# Patient Record
Sex: FEMALE | Race: WHITE | Employment: OTHER | URBAN - METROPOLITAN AREA
[De-identification: names, ages, dates, MRNs, and addresses within clinical notes are randomized per-mention and may not be internally consistent; named-entity substitution may affect disease eponyms.]

---

## 2022-01-01 ENCOUNTER — APPOINTMENT (OUTPATIENT)
Dept: CT IMAGING | Age: 87
DRG: 871 | End: 2022-01-01
Payer: MEDICARE

## 2022-01-01 ENCOUNTER — APPOINTMENT (OUTPATIENT)
Dept: GENERAL RADIOLOGY | Age: 87
DRG: 871 | End: 2022-01-01
Payer: MEDICARE

## 2022-01-01 ENCOUNTER — HOSPITAL ENCOUNTER (INPATIENT)
Age: 87
LOS: 2 days | DRG: 872 | End: 2022-09-18
Attending: INTERNAL MEDICINE | Admitting: INTERNAL MEDICINE
Payer: COMMERCIAL

## 2022-01-01 ENCOUNTER — HOSPITAL ENCOUNTER (INPATIENT)
Age: 87
LOS: 6 days | Discharge: HOSPICE | DRG: 871 | End: 2022-09-16
Attending: EMERGENCY MEDICINE | Admitting: INTERNAL MEDICINE
Payer: MEDICARE

## 2022-01-01 VITALS
RESPIRATION RATE: 16 BRPM | HEART RATE: 85 BPM | DIASTOLIC BLOOD PRESSURE: 52 MMHG | OXYGEN SATURATION: 100 % | BODY MASS INDEX: 20.77 KG/M2 | SYSTOLIC BLOOD PRESSURE: 123 MMHG | WEIGHT: 110 LBS | HEIGHT: 61 IN | TEMPERATURE: 95 F

## 2022-01-01 VITALS
DIASTOLIC BLOOD PRESSURE: 70 MMHG | SYSTOLIC BLOOD PRESSURE: 136 MMHG | HEART RATE: 95 BPM | OXYGEN SATURATION: 81 % | RESPIRATION RATE: 24 BRPM | TEMPERATURE: 98.6 F

## 2022-01-01 DIAGNOSIS — A41.9 SEPSIS, DUE TO UNSPECIFIED ORGANISM, UNSPECIFIED WHETHER ACUTE ORGAN DYSFUNCTION PRESENT (HCC): Primary | ICD-10-CM

## 2022-01-01 LAB
ABSOLUTE EOS #: 0.06 K/UL (ref 0–0.44)
ABSOLUTE EOS #: 0.07 K/UL (ref 0–0.44)
ABSOLUTE EOS #: 0.15 K/UL (ref 0–0.44)
ABSOLUTE EOS #: 0.16 K/UL (ref 0–0.44)
ABSOLUTE EOS #: 0.39 K/UL (ref 0–0.44)
ABSOLUTE EOS #: 0.45 K/UL (ref 0–0.44)
ABSOLUTE IMMATURE GRANULOCYTE: 0.08 K/UL (ref 0–0.3)
ABSOLUTE IMMATURE GRANULOCYTE: 0.08 K/UL (ref 0–0.3)
ABSOLUTE IMMATURE GRANULOCYTE: 0.11 K/UL (ref 0–0.3)
ABSOLUTE IMMATURE GRANULOCYTE: 0.11 K/UL (ref 0–0.3)
ABSOLUTE IMMATURE GRANULOCYTE: 0.16 K/UL (ref 0–0.3)
ABSOLUTE IMMATURE GRANULOCYTE: 0.19 K/UL (ref 0–0.3)
ABSOLUTE LYMPH #: 0.47 K/UL (ref 1.1–3.7)
ABSOLUTE LYMPH #: 0.78 K/UL (ref 1.1–3.7)
ABSOLUTE LYMPH #: 1.17 K/UL (ref 1.1–3.7)
ABSOLUTE LYMPH #: 1.19 K/UL (ref 1.1–3.7)
ABSOLUTE LYMPH #: 1.28 K/UL (ref 1.1–3.7)
ABSOLUTE LYMPH #: 1.31 K/UL (ref 1.1–3.7)
ABSOLUTE MONO #: 0.87 K/UL (ref 0.1–1.2)
ABSOLUTE MONO #: 0.94 K/UL (ref 0.1–1.2)
ABSOLUTE MONO #: 1.05 K/UL (ref 0.1–1.2)
ABSOLUTE MONO #: 1.07 K/UL (ref 0.1–1.2)
ABSOLUTE MONO #: 1.13 K/UL (ref 0.1–1.2)
ABSOLUTE MONO #: 1.16 K/UL (ref 0.1–1.2)
ALBUMIN SERPL-MCNC: 3.4 G/DL (ref 3.5–5.2)
ALBUMIN/GLOBULIN RATIO: 0.9 (ref 1–2.5)
ALP BLD-CCNC: 71 U/L (ref 35–104)
ALT SERPL-CCNC: 15 U/L (ref 5–33)
ANION GAP SERPL CALCULATED.3IONS-SCNC: 10 MMOL/L (ref 9–17)
ANION GAP SERPL CALCULATED.3IONS-SCNC: 12 MMOL/L (ref 9–17)
ANION GAP SERPL CALCULATED.3IONS-SCNC: 7 MMOL/L (ref 9–17)
ANION GAP SERPL CALCULATED.3IONS-SCNC: 7 MMOL/L (ref 9–17)
AST SERPL-CCNC: 31 U/L
BACTERIA: ABNORMAL
BASOPHILS # BLD: 0 % (ref 0–2)
BASOPHILS ABSOLUTE: 0 K/UL (ref 0–0.2)
BASOPHILS ABSOLUTE: 0 K/UL (ref 0–0.2)
BASOPHILS ABSOLUTE: 0.03 K/UL (ref 0–0.2)
BASOPHILS ABSOLUTE: 0.03 K/UL (ref 0–0.2)
BASOPHILS ABSOLUTE: 0.04 K/UL (ref 0–0.2)
BASOPHILS ABSOLUTE: 0.06 K/UL (ref 0–0.2)
BILIRUB SERPL-MCNC: 0.5 MG/DL (ref 0.3–1.2)
BILIRUBIN URINE: NEGATIVE
BUN BLDV-MCNC: 11 MG/DL (ref 8–23)
BUN BLDV-MCNC: 15 MG/DL (ref 8–23)
BUN BLDV-MCNC: 15 MG/DL (ref 8–23)
BUN BLDV-MCNC: 16 MG/DL (ref 8–23)
BUN BLDV-MCNC: 18 MG/DL (ref 8–23)
BUN BLDV-MCNC: 19 MG/DL (ref 8–23)
CALCIUM SERPL-MCNC: 8.4 MG/DL (ref 8.6–10.4)
CALCIUM SERPL-MCNC: 8.9 MG/DL (ref 8.6–10.4)
CALCIUM SERPL-MCNC: 8.9 MG/DL (ref 8.6–10.4)
CALCIUM SERPL-MCNC: 9.1 MG/DL (ref 8.6–10.4)
CALCIUM SERPL-MCNC: 9.1 MG/DL (ref 8.6–10.4)
CALCIUM SERPL-MCNC: 9.4 MG/DL (ref 8.6–10.4)
CASTS UA: ABNORMAL /LPF (ref 0–8)
CHLORIDE BLD-SCNC: 86 MMOL/L (ref 98–107)
CHLORIDE BLD-SCNC: 87 MMOL/L (ref 98–107)
CHLORIDE BLD-SCNC: 91 MMOL/L (ref 98–107)
CHLORIDE BLD-SCNC: 92 MMOL/L (ref 98–107)
CHLORIDE BLD-SCNC: 93 MMOL/L (ref 98–107)
CHLORIDE BLD-SCNC: 94 MMOL/L (ref 98–107)
CO2: 24 MMOL/L (ref 20–31)
CO2: 26 MMOL/L (ref 20–31)
CO2: 30 MMOL/L (ref 20–31)
CO2: 32 MMOL/L (ref 20–31)
CO2: 33 MMOL/L (ref 20–31)
CO2: 34 MMOL/L (ref 20–31)
COLOR: YELLOW
CREAT SERPL-MCNC: 0.48 MG/DL (ref 0.5–0.9)
CREAT SERPL-MCNC: 0.55 MG/DL (ref 0.5–0.9)
CREAT SERPL-MCNC: 0.56 MG/DL (ref 0.5–0.9)
CREAT SERPL-MCNC: 0.59 MG/DL (ref 0.5–0.9)
CREAT SERPL-MCNC: 0.6 MG/DL (ref 0.5–0.9)
CREAT SERPL-MCNC: 0.72 MG/DL (ref 0.5–0.9)
CULTURE: ABNORMAL
CULTURE: NORMAL
CULTURE: NORMAL
D-DIMER QUANTITATIVE: 4.99 MG/L FEU
EKG ATRIAL RATE: 104 BPM
EKG P AXIS: 50 DEGREES
EKG P-R INTERVAL: 138 MS
EKG Q-T INTERVAL: 310 MS
EKG QRS DURATION: 82 MS
EKG QTC CALCULATION (BAZETT): 407 MS
EKG R AXIS: -4 DEGREES
EKG T AXIS: 57 DEGREES
EKG VENTRICULAR RATE: 104 BPM
EOSINOPHILS RELATIVE PERCENT: 0 % (ref 1–4)
EOSINOPHILS RELATIVE PERCENT: 0 % (ref 1–4)
EOSINOPHILS RELATIVE PERCENT: 1 % (ref 1–4)
EOSINOPHILS RELATIVE PERCENT: 1 % (ref 1–4)
EOSINOPHILS RELATIVE PERCENT: 2 % (ref 1–4)
EOSINOPHILS RELATIVE PERCENT: 3 % (ref 1–4)
EPITHELIAL CELLS UA: ABNORMAL /HPF (ref 0–5)
GFR AFRICAN AMERICAN: >60 ML/MIN
GFR NON-AFRICAN AMERICAN: >60 ML/MIN
GFR SERPL CREATININE-BSD FRML MDRD: ABNORMAL ML/MIN/{1.73_M2}
GLUCOSE BLD-MCNC: 102 MG/DL (ref 65–105)
GLUCOSE BLD-MCNC: 102 MG/DL (ref 70–99)
GLUCOSE BLD-MCNC: 104 MG/DL (ref 70–99)
GLUCOSE BLD-MCNC: 129 MG/DL (ref 65–105)
GLUCOSE BLD-MCNC: 132 MG/DL (ref 65–105)
GLUCOSE BLD-MCNC: 153 MG/DL (ref 65–105)
GLUCOSE BLD-MCNC: 183 MG/DL (ref 65–105)
GLUCOSE BLD-MCNC: 221 MG/DL (ref 65–105)
GLUCOSE BLD-MCNC: 82 MG/DL (ref 70–99)
GLUCOSE BLD-MCNC: 89 MG/DL (ref 65–105)
GLUCOSE BLD-MCNC: 92 MG/DL (ref 65–105)
GLUCOSE BLD-MCNC: 92 MG/DL (ref 65–105)
GLUCOSE BLD-MCNC: 92 MG/DL (ref 70–99)
GLUCOSE BLD-MCNC: 93 MG/DL (ref 65–105)
GLUCOSE BLD-MCNC: 93 MG/DL (ref 70–99)
GLUCOSE BLD-MCNC: 94 MG/DL (ref 65–105)
GLUCOSE BLD-MCNC: 96 MG/DL (ref 70–99)
GLUCOSE BLD-MCNC: 97 MG/DL (ref 65–105)
GLUCOSE BLD-MCNC: 99 MG/DL (ref 65–105)
GLUCOSE URINE: NEGATIVE
HCT VFR BLD CALC: 34.4 % (ref 36.3–47.1)
HCT VFR BLD CALC: 34.8 % (ref 36.3–47.1)
HCT VFR BLD CALC: 35.5 % (ref 36.3–47.1)
HCT VFR BLD CALC: 36.3 % (ref 36.3–47.1)
HCT VFR BLD CALC: 36.8 % (ref 36.3–47.1)
HCT VFR BLD CALC: 40.8 % (ref 36.3–47.1)
HEMOGLOBIN: 11.5 G/DL (ref 11.9–15.1)
HEMOGLOBIN: 11.6 G/DL (ref 11.9–15.1)
HEMOGLOBIN: 11.9 G/DL (ref 11.9–15.1)
HEMOGLOBIN: 11.9 G/DL (ref 11.9–15.1)
HEMOGLOBIN: 12.7 G/DL (ref 11.9–15.1)
HEMOGLOBIN: 12.9 G/DL (ref 11.9–15.1)
IMMATURE GRANULOCYTES: 1 %
KETONES, URINE: NEGATIVE
LACTIC ACID, SEPSIS WHOLE BLOOD: 0.7 MMOL/L (ref 0.5–1.9)
LACTIC ACID, SEPSIS WHOLE BLOOD: 1.3 MMOL/L (ref 0.5–1.9)
LACTIC ACID, WHOLE BLOOD: 1 MMOL/L (ref 0.7–2.1)
LEUKOCYTE ESTERASE, URINE: ABNORMAL
LYMPHOCYTES # BLD: 3 % (ref 24–43)
LYMPHOCYTES # BLD: 4 % (ref 24–43)
LYMPHOCYTES # BLD: 7 % (ref 24–43)
LYMPHOCYTES # BLD: 7 % (ref 24–43)
LYMPHOCYTES # BLD: 8 % (ref 24–43)
LYMPHOCYTES # BLD: 9 % (ref 24–43)
Lab: NORMAL
Lab: NORMAL
MCH RBC QN AUTO: 29.7 PG (ref 25.2–33.5)
MCH RBC QN AUTO: 30.6 PG (ref 25.2–33.5)
MCH RBC QN AUTO: 30.7 PG (ref 25.2–33.5)
MCH RBC QN AUTO: 30.7 PG (ref 25.2–33.5)
MCH RBC QN AUTO: 31.2 PG (ref 25.2–33.5)
MCH RBC QN AUTO: 31.3 PG (ref 25.2–33.5)
MCHC RBC AUTO-ENTMCNC: 31.6 G/DL (ref 28.4–34.8)
MCHC RBC AUTO-ENTMCNC: 32.8 G/DL (ref 28.4–34.8)
MCHC RBC AUTO-ENTMCNC: 33.3 G/DL (ref 28.4–34.8)
MCHC RBC AUTO-ENTMCNC: 33.4 G/DL (ref 28.4–34.8)
MCHC RBC AUTO-ENTMCNC: 33.5 G/DL (ref 28.4–34.8)
MCHC RBC AUTO-ENTMCNC: 34.5 G/DL (ref 28.4–34.8)
MCV RBC AUTO: 90.6 FL (ref 82.6–102.9)
MCV RBC AUTO: 91.5 FL (ref 82.6–102.9)
MCV RBC AUTO: 92.1 FL (ref 82.6–102.9)
MCV RBC AUTO: 92.9 FL (ref 82.6–102.9)
MCV RBC AUTO: 93.6 FL (ref 82.6–102.9)
MCV RBC AUTO: 93.8 FL (ref 82.6–102.9)
MONOCYTES # BLD: 6 % (ref 3–12)
MONOCYTES # BLD: 7 % (ref 3–12)
MORPHOLOGY: NORMAL
MORPHOLOGY: NORMAL
NITRITE, URINE: POSITIVE
NRBC AUTOMATED: 0 PER 100 WBC
PDW BLD-RTO: 11.5 % (ref 11.8–14.4)
PDW BLD-RTO: 11.8 % (ref 11.8–14.4)
PDW BLD-RTO: 11.9 % (ref 11.8–14.4)
PH UA: 6.5 (ref 5–8)
PLATELET # BLD: 402 K/UL (ref 138–453)
PLATELET # BLD: 415 K/UL (ref 138–453)
PLATELET # BLD: 424 K/UL (ref 138–453)
PLATELET # BLD: 439 K/UL (ref 138–453)
PLATELET # BLD: 455 K/UL (ref 138–453)
PLATELET # BLD: ABNORMAL K/UL (ref 138–453)
PLATELET, FLUORESCENCE: 491 K/UL (ref 138–453)
PLATELET, IMMATURE FRACTION: 1.5 % (ref 1.1–10.3)
PMV BLD AUTO: 8.3 FL (ref 8.1–13.5)
PMV BLD AUTO: 8.5 FL (ref 8.1–13.5)
PMV BLD AUTO: 8.6 FL (ref 8.1–13.5)
PMV BLD AUTO: 8.7 FL (ref 8.1–13.5)
PMV BLD AUTO: 8.8 FL (ref 8.1–13.5)
POTASSIUM SERPL-SCNC: 4.3 MMOL/L (ref 3.7–5.3)
POTASSIUM SERPL-SCNC: 4.4 MMOL/L (ref 3.7–5.3)
POTASSIUM SERPL-SCNC: 4.6 MMOL/L (ref 3.7–5.3)
POTASSIUM SERPL-SCNC: 4.7 MMOL/L (ref 3.7–5.3)
POTASSIUM SERPL-SCNC: 4.9 MMOL/L (ref 3.7–5.3)
POTASSIUM SERPL-SCNC: 5.4 MMOL/L (ref 3.7–5.3)
PROTEIN UA: ABNORMAL
RBC # BLD: 3.76 M/UL (ref 3.95–5.11)
RBC # BLD: 3.78 M/UL (ref 3.95–5.11)
RBC # BLD: 3.82 M/UL (ref 3.95–5.11)
RBC # BLD: 3.88 M/UL (ref 3.95–5.11)
RBC # BLD: 4.06 M/UL (ref 3.95–5.11)
RBC # BLD: 4.35 M/UL (ref 3.95–5.11)
RBC UA: ABNORMAL /HPF (ref 0–4)
SARS-COV-2, RAPID: NOT DETECTED
SEG NEUTROPHILS: 81 % (ref 36–65)
SEG NEUTROPHILS: 85 % (ref 36–65)
SEG NEUTROPHILS: 87 % (ref 36–65)
SEG NEUTROPHILS: 89 % (ref 36–65)
SEGMENTED NEUTROPHILS ABSOLUTE COUNT: 10.89 K/UL (ref 1.5–8.1)
SEGMENTED NEUTROPHILS ABSOLUTE COUNT: 13.91 K/UL (ref 1.5–8.1)
SEGMENTED NEUTROPHILS ABSOLUTE COUNT: 13.97 K/UL (ref 1.5–8.1)
SEGMENTED NEUTROPHILS ABSOLUTE COUNT: 14.46 K/UL (ref 1.5–8.1)
SEGMENTED NEUTROPHILS ABSOLUTE COUNT: 15.88 K/UL (ref 1.5–8.1)
SEGMENTED NEUTROPHILS ABSOLUTE COUNT: 16.88 K/UL (ref 1.5–8.1)
SODIUM BLD-SCNC: 127 MMOL/L (ref 135–144)
SODIUM BLD-SCNC: 129 MMOL/L (ref 135–144)
SODIUM BLD-SCNC: 131 MMOL/L (ref 135–144)
SODIUM BLD-SCNC: 131 MMOL/L (ref 135–144)
SODIUM BLD-SCNC: 132 MMOL/L (ref 135–144)
SODIUM BLD-SCNC: 132 MMOL/L (ref 135–144)
SPECIFIC GRAVITY UA: 1.02 (ref 1–1.03)
SPECIMEN DESCRIPTION: ABNORMAL
SPECIMEN DESCRIPTION: NORMAL
TOTAL PROTEIN: 7 G/DL (ref 6.4–8.3)
TURBIDITY: ABNORMAL
URINE HGB: ABNORMAL
UROBILINOGEN, URINE: NORMAL
WBC # BLD: 13.5 K/UL (ref 3.5–11.3)
WBC # BLD: 15.7 K/UL (ref 3.5–11.3)
WBC # BLD: 16.4 K/UL (ref 3.5–11.3)
WBC # BLD: 17 K/UL (ref 3.5–11.3)
WBC # BLD: 18.6 K/UL (ref 3.5–11.3)
WBC # BLD: 19.4 K/UL (ref 3.5–11.3)
WBC UA: ABNORMAL /HPF (ref 0–5)

## 2022-01-01 PROCEDURE — 2580000003 HC RX 258

## 2022-01-01 PROCEDURE — 6360000002 HC RX W HCPCS: Performed by: STUDENT IN AN ORGANIZED HEALTH CARE EDUCATION/TRAINING PROGRAM

## 2022-01-01 PROCEDURE — 82947 ASSAY GLUCOSE BLOOD QUANT: CPT

## 2022-01-01 PROCEDURE — 85025 COMPLETE CBC W/AUTO DIFF WBC: CPT

## 2022-01-01 PROCEDURE — 6370000000 HC RX 637 (ALT 250 FOR IP): Performed by: STUDENT IN AN ORGANIZED HEALTH CARE EDUCATION/TRAINING PROGRAM

## 2022-01-01 PROCEDURE — 94761 N-INVAS EAR/PLS OXIMETRY MLT: CPT

## 2022-01-01 PROCEDURE — 6370000000 HC RX 637 (ALT 250 FOR IP)

## 2022-01-01 PROCEDURE — 87040 BLOOD CULTURE FOR BACTERIA: CPT

## 2022-01-01 PROCEDURE — 80048 BASIC METABOLIC PNL TOTAL CA: CPT

## 2022-01-01 PROCEDURE — 6360000002 HC RX W HCPCS

## 2022-01-01 PROCEDURE — 97535 SELF CARE MNGMENT TRAINING: CPT

## 2022-01-01 PROCEDURE — 94640 AIRWAY INHALATION TREATMENT: CPT

## 2022-01-01 PROCEDURE — 83605 ASSAY OF LACTIC ACID: CPT

## 2022-01-01 PROCEDURE — 2060000000 HC ICU INTERMEDIATE R&B

## 2022-01-01 PROCEDURE — 99223 1ST HOSP IP/OBS HIGH 75: CPT | Performed by: INTERNAL MEDICINE

## 2022-01-01 PROCEDURE — 99232 SBSQ HOSP IP/OBS MODERATE 35: CPT | Performed by: INTERNAL MEDICINE

## 2022-01-01 PROCEDURE — 99285 EMERGENCY DEPT VISIT HI MDM: CPT

## 2022-01-01 PROCEDURE — 2700000000 HC OXYGEN THERAPY PER DAY

## 2022-01-01 PROCEDURE — 85055 RETICULATED PLATELET ASSAY: CPT

## 2022-01-01 PROCEDURE — 2580000003 HC RX 258: Performed by: STUDENT IN AN ORGANIZED HEALTH CARE EDUCATION/TRAINING PROGRAM

## 2022-01-01 PROCEDURE — 87635 SARS-COV-2 COVID-19 AMP PRB: CPT

## 2022-01-01 PROCEDURE — 2500000003 HC RX 250 WO HCPCS

## 2022-01-01 PROCEDURE — 80053 COMPREHEN METABOLIC PANEL: CPT

## 2022-01-01 PROCEDURE — 94660 CPAP INITIATION&MGMT: CPT

## 2022-01-01 PROCEDURE — 71045 X-RAY EXAM CHEST 1 VIEW: CPT

## 2022-01-01 PROCEDURE — 97530 THERAPEUTIC ACTIVITIES: CPT

## 2022-01-01 PROCEDURE — 93010 ELECTROCARDIOGRAM REPORT: CPT | Performed by: INTERNAL MEDICINE

## 2022-01-01 PROCEDURE — 85379 FIBRIN DEGRADATION QUANT: CPT

## 2022-01-01 PROCEDURE — 1250000000 HC SEMI PRIVATE HOSPICE R&B

## 2022-01-01 PROCEDURE — 36415 COLL VENOUS BLD VENIPUNCTURE: CPT

## 2022-01-01 PROCEDURE — 2500000003 HC RX 250 WO HCPCS: Performed by: STUDENT IN AN ORGANIZED HEALTH CARE EDUCATION/TRAINING PROGRAM

## 2022-01-01 PROCEDURE — 99291 CRITICAL CARE FIRST HOUR: CPT | Performed by: INTERNAL MEDICINE

## 2022-01-01 PROCEDURE — 6360000004 HC RX CONTRAST MEDICATION: Performed by: STUDENT IN AN ORGANIZED HEALTH CARE EDUCATION/TRAINING PROGRAM

## 2022-01-01 PROCEDURE — 81001 URINALYSIS AUTO W/SCOPE: CPT

## 2022-01-01 PROCEDURE — 87086 URINE CULTURE/COLONY COUNT: CPT

## 2022-01-01 PROCEDURE — 87186 SC STD MICRODIL/AGAR DIL: CPT

## 2022-01-01 PROCEDURE — 97162 PT EVAL MOD COMPLEX 30 MIN: CPT

## 2022-01-01 PROCEDURE — 93005 ELECTROCARDIOGRAM TRACING: CPT | Performed by: STUDENT IN AN ORGANIZED HEALTH CARE EDUCATION/TRAINING PROGRAM

## 2022-01-01 PROCEDURE — 97166 OT EVAL MOD COMPLEX 45 MIN: CPT

## 2022-01-01 PROCEDURE — 71260 CT THORAX DX C+: CPT | Performed by: STUDENT IN AN ORGANIZED HEALTH CARE EDUCATION/TRAINING PROGRAM

## 2022-01-01 PROCEDURE — 87088 URINE BACTERIA CULTURE: CPT

## 2022-01-01 RX ORDER — BENZONATATE 100 MG/1
100 CAPSULE ORAL 3 TIMES DAILY PRN
Status: DISCONTINUED | OUTPATIENT
Start: 2022-01-01 | End: 2022-01-01 | Stop reason: HOSPADM

## 2022-01-01 RX ORDER — SODIUM PHOSPHATE, DIBASIC AND SODIUM PHOSPHATE, MONOBASIC 7; 19 G/133ML; G/133ML
1 ENEMA RECTAL
Status: COMPLETED | OUTPATIENT
Start: 2022-01-01 | End: 2022-01-01

## 2022-01-01 RX ORDER — MORPHINE SULFATE 10 MG/5ML
5 SOLUTION ORAL
Status: CANCELLED | OUTPATIENT
Start: 2022-01-01

## 2022-01-01 RX ORDER — ONDANSETRON 2 MG/ML
4 INJECTION INTRAMUSCULAR; INTRAVENOUS EVERY 6 HOURS PRN
Status: DISCONTINUED | OUTPATIENT
Start: 2022-01-01 | End: 2022-01-01 | Stop reason: HOSPADM

## 2022-01-01 RX ORDER — GLYCOPYRROLATE 0.2 MG/ML
0.2 INJECTION INTRAMUSCULAR; INTRAVENOUS EVERY 4 HOURS PRN
Status: DISCONTINUED | OUTPATIENT
Start: 2022-01-01 | End: 2022-01-01 | Stop reason: HOSPADM

## 2022-01-01 RX ORDER — ZOLPIDEM TARTRATE 5 MG/1
5 TABLET ORAL NIGHTLY PRN
Status: CANCELLED | OUTPATIENT
Start: 2022-01-01

## 2022-01-01 RX ORDER — SODIUM CHLORIDE 9 MG/ML
INJECTION, SOLUTION INTRAVENOUS PRN
Status: DISCONTINUED | OUTPATIENT
Start: 2022-01-01 | End: 2022-01-01

## 2022-01-01 RX ORDER — SODIUM CHLORIDE 0.9 % (FLUSH) 0.9 %
5-40 SYRINGE (ML) INJECTION EVERY 12 HOURS SCHEDULED
Status: DISCONTINUED | OUTPATIENT
Start: 2022-01-01 | End: 2022-01-01

## 2022-01-01 RX ORDER — ALBUTEROL SULFATE 90 UG/1
2 AEROSOL, METERED RESPIRATORY (INHALATION) EVERY 6 HOURS PRN
COMMUNITY

## 2022-01-01 RX ORDER — LORAZEPAM 1 MG/1
1 TABLET ORAL
Status: DISCONTINUED | OUTPATIENT
Start: 2022-01-01 | End: 2022-01-01 | Stop reason: HOSPADM

## 2022-01-01 RX ORDER — LORAZEPAM 2 MG/ML
0.5 INJECTION INTRAMUSCULAR EVERY 4 HOURS PRN
Status: DISCONTINUED | OUTPATIENT
Start: 2022-01-01 | End: 2022-01-01 | Stop reason: HOSPADM

## 2022-01-01 RX ORDER — ACETAMINOPHEN 325 MG/1
650 TABLET ORAL EVERY 4 HOURS PRN
Status: DISCONTINUED | OUTPATIENT
Start: 2022-01-01 | End: 2022-01-01 | Stop reason: HOSPADM

## 2022-01-01 RX ORDER — SODIUM CHLORIDE 0.9 % (FLUSH) 0.9 %
5-40 SYRINGE (ML) INJECTION PRN
Status: CANCELLED | OUTPATIENT
Start: 2022-01-01

## 2022-01-01 RX ORDER — GUAIFENESIN DEXTROMETHORPHAN HYDROBROMIDE ORAL SOLUTION 10; 100 MG/5ML; MG/5ML
10 SOLUTION ORAL EVERY 6 HOURS PRN
Status: DISCONTINUED | OUTPATIENT
Start: 2022-01-01 | End: 2022-01-01 | Stop reason: HOSPADM

## 2022-01-01 RX ORDER — LORAZEPAM 1 MG/1
1 TABLET ORAL
Status: CANCELLED | OUTPATIENT
Start: 2022-01-01

## 2022-01-01 RX ORDER — GUAIFENESIN DEXTROMETHORPHAN HYDROBROMIDE ORAL SOLUTION 10; 100 MG/5ML; MG/5ML
10 SOLUTION ORAL EVERY 6 HOURS PRN
Status: DISCONTINUED | OUTPATIENT
Start: 2022-01-01 | End: 2022-01-01

## 2022-01-01 RX ORDER — ACETAMINOPHEN 325 MG/1
650 TABLET ORAL EVERY 4 HOURS PRN
Status: CANCELLED | OUTPATIENT
Start: 2022-01-01

## 2022-01-01 RX ORDER — ONDANSETRON 4 MG/1
4 TABLET, ORALLY DISINTEGRATING ORAL EVERY 8 HOURS PRN
Status: DISCONTINUED | OUTPATIENT
Start: 2022-01-01 | End: 2022-01-01 | Stop reason: HOSPADM

## 2022-01-01 RX ORDER — ALPRAZOLAM 0.25 MG/1
0.25 TABLET ORAL DAILY PRN
Status: DISCONTINUED | OUTPATIENT
Start: 2022-01-01 | End: 2022-01-01

## 2022-01-01 RX ORDER — ALPRAZOLAM 0.25 MG/1
0.25 TABLET ORAL NIGHTLY PRN
COMMUNITY

## 2022-01-01 RX ORDER — ZOLPIDEM TARTRATE 5 MG/1
5 TABLET ORAL NIGHTLY PRN
Status: DISCONTINUED | OUTPATIENT
Start: 2022-01-01 | End: 2022-01-01

## 2022-01-01 RX ORDER — DEXTROSE MONOHYDRATE 100 MG/ML
INJECTION, SOLUTION INTRAVENOUS CONTINUOUS PRN
Status: DISCONTINUED | OUTPATIENT
Start: 2022-01-01 | End: 2022-01-01 | Stop reason: HOSPADM

## 2022-01-01 RX ORDER — ALPRAZOLAM 0.5 MG/1
0.5 TABLET ORAL 4 TIMES DAILY PRN
Status: DISCONTINUED | OUTPATIENT
Start: 2022-01-01 | End: 2022-01-01 | Stop reason: HOSPADM

## 2022-01-01 RX ORDER — VANCOMYCIN HYDROCHLORIDE 1 G/200ML
1000 INJECTION, SOLUTION INTRAVENOUS ONCE
Status: COMPLETED | OUTPATIENT
Start: 2022-01-01 | End: 2022-01-01

## 2022-01-01 RX ORDER — MORPHINE SULFATE 10 MG/5ML
5 SOLUTION ORAL
Status: DISCONTINUED | OUTPATIENT
Start: 2022-01-01 | End: 2022-01-01 | Stop reason: HOSPADM

## 2022-01-01 RX ORDER — BENZONATATE 100 MG/1
100 CAPSULE ORAL 3 TIMES DAILY PRN
Status: DISCONTINUED | OUTPATIENT
Start: 2022-01-01 | End: 2022-01-01

## 2022-01-01 RX ORDER — GLYCOPYRROLATE 0.2 MG/ML
0.2 INJECTION INTRAMUSCULAR; INTRAVENOUS EVERY 4 HOURS PRN
Status: CANCELLED | OUTPATIENT
Start: 2022-01-01

## 2022-01-01 RX ORDER — ALBUTEROL SULFATE 90 UG/1
2 AEROSOL, METERED RESPIRATORY (INHALATION) EVERY 6 HOURS PRN
Status: DISCONTINUED | OUTPATIENT
Start: 2022-01-01 | End: 2022-01-01 | Stop reason: HOSPADM

## 2022-01-01 RX ORDER — LOPERAMIDE HYDROCHLORIDE 2 MG/1
2 CAPSULE ORAL PRN
Status: DISCONTINUED | OUTPATIENT
Start: 2022-01-01 | End: 2022-01-01

## 2022-01-01 RX ORDER — LOPERAMIDE HYDROCHLORIDE 2 MG/1
2 CAPSULE ORAL PRN
Status: DISCONTINUED | OUTPATIENT
Start: 2022-01-01 | End: 2022-01-01 | Stop reason: HOSPADM

## 2022-01-01 RX ORDER — ALPRAZOLAM 0.5 MG/1
0.5 TABLET ORAL EVERY 4 HOURS PRN
Status: DISCONTINUED | OUTPATIENT
Start: 2022-01-01 | End: 2022-01-01 | Stop reason: HOSPADM

## 2022-01-01 RX ORDER — LOPERAMIDE HYDROCHLORIDE 2 MG/1
2 CAPSULE ORAL PRN
Status: CANCELLED | OUTPATIENT
Start: 2022-01-01

## 2022-01-01 RX ORDER — ALBUTEROL SULFATE 90 UG/1
2 AEROSOL, METERED RESPIRATORY (INHALATION) EVERY 6 HOURS PRN
Status: DISCONTINUED | OUTPATIENT
Start: 2022-01-01 | End: 2022-01-01

## 2022-01-01 RX ORDER — ALPRAZOLAM 0.5 MG/1
0.5 TABLET ORAL 4 TIMES DAILY PRN
Status: DISCONTINUED | OUTPATIENT
Start: 2022-01-01 | End: 2022-01-01

## 2022-01-01 RX ORDER — MORPHINE SULFATE 4 MG/ML
4 INJECTION, SOLUTION INTRAMUSCULAR; INTRAVENOUS
Status: DISCONTINUED | OUTPATIENT
Start: 2022-01-01 | End: 2022-01-01 | Stop reason: HOSPADM

## 2022-01-01 RX ORDER — SODIUM CHLORIDE 0.9 % (FLUSH) 0.9 %
5-40 SYRINGE (ML) INJECTION PRN
Status: DISCONTINUED | OUTPATIENT
Start: 2022-01-01 | End: 2022-01-01 | Stop reason: HOSPADM

## 2022-01-01 RX ORDER — MORPHINE SULFATE 2 MG/ML
2 INJECTION, SOLUTION INTRAMUSCULAR; INTRAVENOUS
Status: DISCONTINUED | OUTPATIENT
Start: 2022-01-01 | End: 2022-01-01 | Stop reason: HOSPADM

## 2022-01-01 RX ORDER — POLYETHYLENE GLYCOL 3350 17 G/17G
17 POWDER, FOR SOLUTION ORAL DAILY PRN
Status: DISCONTINUED | OUTPATIENT
Start: 2022-01-01 | End: 2022-01-01

## 2022-01-01 RX ORDER — ONDANSETRON 4 MG/1
4 TABLET, ORALLY DISINTEGRATING ORAL EVERY 8 HOURS PRN
Status: CANCELLED | OUTPATIENT
Start: 2022-01-01

## 2022-01-01 RX ORDER — BENZONATATE 100 MG/1
100 CAPSULE ORAL 3 TIMES DAILY PRN
Status: CANCELLED | OUTPATIENT
Start: 2022-01-01

## 2022-01-01 RX ORDER — DEXTROSE MONOHYDRATE 100 MG/ML
INJECTION, SOLUTION INTRAVENOUS CONTINUOUS PRN
Status: DISCONTINUED | OUTPATIENT
Start: 2022-01-01 | End: 2022-01-01

## 2022-01-01 RX ORDER — KETOROLAC TROMETHAMINE 30 MG/ML
15 INJECTION, SOLUTION INTRAMUSCULAR; INTRAVENOUS ONCE
Status: COMPLETED | OUTPATIENT
Start: 2022-01-01 | End: 2022-01-01

## 2022-01-01 RX ORDER — SODIUM CHLORIDE 9 MG/ML
INJECTION, SOLUTION INTRAVENOUS PRN
Status: DISCONTINUED | OUTPATIENT
Start: 2022-01-01 | End: 2022-01-01 | Stop reason: HOSPADM

## 2022-01-01 RX ORDER — ALBUTEROL SULFATE 90 UG/1
2 AEROSOL, METERED RESPIRATORY (INHALATION) EVERY 6 HOURS PRN
Status: CANCELLED | OUTPATIENT
Start: 2022-01-01

## 2022-01-01 RX ORDER — ZOLPIDEM TARTRATE 5 MG/1
5 TABLET ORAL NIGHTLY PRN
COMMUNITY

## 2022-01-01 RX ORDER — ALPRAZOLAM 0.25 MG/1
0.25 TABLET ORAL 2 TIMES DAILY PRN
Status: DISCONTINUED | OUTPATIENT
Start: 2022-01-01 | End: 2022-01-01

## 2022-01-01 RX ORDER — ENOXAPARIN SODIUM 100 MG/ML
30 INJECTION SUBCUTANEOUS DAILY
Status: DISCONTINUED | OUTPATIENT
Start: 2022-01-01 | End: 2022-01-01

## 2022-01-01 RX ORDER — POLYETHYLENE GLYCOL 3350 17 G/17G
17 POWDER, FOR SOLUTION ORAL DAILY PRN
Status: DISCONTINUED | OUTPATIENT
Start: 2022-01-01 | End: 2022-01-01 | Stop reason: HOSPADM

## 2022-01-01 RX ORDER — DEXTROSE MONOHYDRATE 100 MG/ML
INJECTION, SOLUTION INTRAVENOUS CONTINUOUS PRN
Status: CANCELLED | OUTPATIENT
Start: 2022-01-01

## 2022-01-01 RX ORDER — MORPHINE SULFATE 2 MG/ML
2 INJECTION, SOLUTION INTRAMUSCULAR; INTRAVENOUS
Status: CANCELLED | OUTPATIENT
Start: 2022-01-01

## 2022-01-01 RX ORDER — ONDANSETRON 2 MG/ML
4 INJECTION INTRAMUSCULAR; INTRAVENOUS EVERY 6 HOURS PRN
Status: CANCELLED | OUTPATIENT
Start: 2022-01-01

## 2022-01-01 RX ORDER — ALPRAZOLAM 0.25 MG/1
0.25 TABLET ORAL NIGHTLY PRN
Status: DISCONTINUED | OUTPATIENT
Start: 2022-01-01 | End: 2022-01-01

## 2022-01-01 RX ORDER — SODIUM CHLORIDE 9 MG/ML
INJECTION, SOLUTION INTRAVENOUS PRN
Status: CANCELLED | OUTPATIENT
Start: 2022-01-01

## 2022-01-01 RX ORDER — ACETAMINOPHEN 650 MG/1
650 SUPPOSITORY RECTAL EVERY 6 HOURS PRN
Status: DISCONTINUED | OUTPATIENT
Start: 2022-01-01 | End: 2022-01-01 | Stop reason: SDUPTHER

## 2022-01-01 RX ORDER — ACETAMINOPHEN 325 MG/1
650 TABLET ORAL EVERY 6 HOURS PRN
Status: DISCONTINUED | OUTPATIENT
Start: 2022-01-01 | End: 2022-01-01 | Stop reason: SDUPTHER

## 2022-01-01 RX ORDER — LORAZEPAM 2 MG/ML
1 CONCENTRATE ORAL
Status: DISCONTINUED | OUTPATIENT
Start: 2022-01-01 | End: 2022-01-01

## 2022-01-01 RX ORDER — PREDNISONE 20 MG/1
40 TABLET ORAL DAILY
Status: DISCONTINUED | OUTPATIENT
Start: 2022-01-01 | End: 2022-01-01

## 2022-01-01 RX ORDER — LORAZEPAM 1 MG/1
1 TABLET ORAL
Status: DISCONTINUED | OUTPATIENT
Start: 2022-01-01 | End: 2022-01-01

## 2022-01-01 RX ORDER — METHYLPREDNISOLONE SODIUM SUCCINATE 40 MG/ML
40 INJECTION, POWDER, LYOPHILIZED, FOR SOLUTION INTRAMUSCULAR; INTRAVENOUS DAILY
Status: DISCONTINUED | OUTPATIENT
Start: 2022-01-01 | End: 2022-01-01

## 2022-01-01 RX ORDER — ALPRAZOLAM 0.5 MG/1
0.5 TABLET ORAL 4 TIMES DAILY PRN
Status: CANCELLED | OUTPATIENT
Start: 2022-01-01

## 2022-01-01 RX ORDER — MORPHINE SULFATE 4 MG/ML
4 INJECTION, SOLUTION INTRAMUSCULAR; INTRAVENOUS
Status: CANCELLED | OUTPATIENT
Start: 2022-01-01

## 2022-01-01 RX ORDER — POLYETHYLENE GLYCOL 3350 17 G/17G
17 POWDER, FOR SOLUTION ORAL DAILY PRN
Status: CANCELLED | OUTPATIENT
Start: 2022-01-01

## 2022-01-01 RX ORDER — ZOLPIDEM TARTRATE 5 MG/1
5 TABLET ORAL NIGHTLY PRN
Status: DISCONTINUED | OUTPATIENT
Start: 2022-01-01 | End: 2022-01-01 | Stop reason: HOSPADM

## 2022-01-01 RX ORDER — 0.9 % SODIUM CHLORIDE 0.9 %
30 INTRAVENOUS SOLUTION INTRAVENOUS ONCE
Status: COMPLETED | OUTPATIENT
Start: 2022-01-01 | End: 2022-01-01

## 2022-01-01 RX ORDER — GUAIFENESIN DEXTROMETHORPHAN HYDROBROMIDE ORAL SOLUTION 10; 100 MG/5ML; MG/5ML
10 SOLUTION ORAL EVERY 6 HOURS PRN
Status: CANCELLED | OUTPATIENT
Start: 2022-01-01

## 2022-01-01 RX ORDER — LORAZEPAM 2 MG/ML
0.5 INJECTION INTRAMUSCULAR EVERY 4 HOURS
Status: DISCONTINUED | OUTPATIENT
Start: 2022-01-01 | End: 2022-01-01

## 2022-01-01 RX ORDER — PREDNISONE 20 MG/1
40 TABLET ORAL DAILY
Status: DISCONTINUED | OUTPATIENT
Start: 2022-01-01 | End: 2022-01-01 | Stop reason: HOSPADM

## 2022-01-01 RX ORDER — IPRATROPIUM BROMIDE AND ALBUTEROL SULFATE 2.5; .5 MG/3ML; MG/3ML
1 SOLUTION RESPIRATORY (INHALATION)
Status: DISCONTINUED | OUTPATIENT
Start: 2022-01-01 | End: 2022-01-01

## 2022-01-01 RX ADMIN — Medication 500 MG: at 20:10

## 2022-01-01 RX ADMIN — GLYCOPYRROLATE 0.2 MG: 0.2 INJECTION INTRAMUSCULAR; INTRAVENOUS at 06:18

## 2022-01-01 RX ADMIN — SODIUM CHLORIDE, PRESERVATIVE FREE 10 ML: 5 INJECTION INTRAVENOUS at 22:20

## 2022-01-01 RX ADMIN — MORPHINE SULFATE 4 MG: 4 INJECTION, SOLUTION INTRAMUSCULAR; INTRAVENOUS at 00:11

## 2022-01-01 RX ADMIN — Medication 500 MG: at 21:50

## 2022-01-01 RX ADMIN — MORPHINE SULFATE 4 MG: 4 INJECTION, SOLUTION INTRAMUSCULAR; INTRAVENOUS at 20:26

## 2022-01-01 RX ADMIN — MORPHINE SULFATE 2 MG: 2 INJECTION, SOLUTION INTRAMUSCULAR; INTRAVENOUS at 20:35

## 2022-01-01 RX ADMIN — IPRATROPIUM BROMIDE AND ALBUTEROL SULFATE 1 AMPULE: .5; 2.5 SOLUTION RESPIRATORY (INHALATION) at 19:59

## 2022-01-01 RX ADMIN — ENOXAPARIN SODIUM 30 MG: 100 INJECTION SUBCUTANEOUS at 10:03

## 2022-01-01 RX ADMIN — PIPERACILLIN AND TAZOBACTAM 3375 MG: 3; .375 INJECTION, POWDER, FOR SOLUTION INTRAVENOUS at 13:45

## 2022-01-01 RX ADMIN — SODIUM CHLORIDE, PRESERVATIVE FREE 10 ML: 5 INJECTION INTRAVENOUS at 06:14

## 2022-01-01 RX ADMIN — IPRATROPIUM BROMIDE AND ALBUTEROL SULFATE 1 AMPULE: .5; 2.5 SOLUTION RESPIRATORY (INHALATION) at 11:17

## 2022-01-01 RX ADMIN — ACETAMINOPHEN 650 MG: 325 TABLET ORAL at 03:46

## 2022-01-01 RX ADMIN — ACETAMINOPHEN 650 MG: 325 TABLET ORAL at 08:49

## 2022-01-01 RX ADMIN — ACETAMINOPHEN 650 MG: 325 TABLET ORAL at 16:46

## 2022-01-01 RX ADMIN — SODIUM CHLORIDE 1497 ML: 9 INJECTION, SOLUTION INTRAVENOUS at 13:45

## 2022-01-01 RX ADMIN — ACETAMINOPHEN 650 MG: 325 TABLET ORAL at 05:08

## 2022-01-01 RX ADMIN — MORPHINE SULFATE 2 MG: 2 INJECTION, SOLUTION INTRAMUSCULAR; INTRAVENOUS at 06:37

## 2022-01-01 RX ADMIN — SODIUM CHLORIDE, PRESERVATIVE FREE 10 ML: 5 INJECTION INTRAVENOUS at 20:05

## 2022-01-01 RX ADMIN — WATER 2000 MG: 1 INJECTION INTRAMUSCULAR; INTRAVENOUS; SUBCUTANEOUS at 20:39

## 2022-01-01 RX ADMIN — ENOXAPARIN SODIUM 30 MG: 100 INJECTION SUBCUTANEOUS at 09:03

## 2022-01-01 RX ADMIN — ALPRAZOLAM 0.5 MG: 0.5 TABLET ORAL at 20:09

## 2022-01-01 RX ADMIN — ALPRAZOLAM 0.25 MG: 0.25 TABLET ORAL at 21:44

## 2022-01-01 RX ADMIN — MORPHINE SULFATE 4 MG: 4 INJECTION, SOLUTION INTRAMUSCULAR; INTRAVENOUS at 18:31

## 2022-01-01 RX ADMIN — Medication 0.5 MG: at 17:59

## 2022-01-01 RX ADMIN — MORPHINE SULFATE 2 MG: 2 INJECTION, SOLUTION INTRAMUSCULAR; INTRAVENOUS at 22:35

## 2022-01-01 RX ADMIN — MORPHINE SULFATE 2 MG: 2 INJECTION, SOLUTION INTRAMUSCULAR; INTRAVENOUS at 12:09

## 2022-01-01 RX ADMIN — MORPHINE SULFATE 4 MG: 4 INJECTION, SOLUTION INTRAMUSCULAR; INTRAVENOUS at 15:47

## 2022-01-01 RX ADMIN — IPRATROPIUM BROMIDE AND ALBUTEROL SULFATE 1 AMPULE: .5; 2.5 SOLUTION RESPIRATORY (INHALATION) at 17:36

## 2022-01-01 RX ADMIN — MORPHINE SULFATE 4 MG: 4 INJECTION, SOLUTION INTRAMUSCULAR; INTRAVENOUS at 06:38

## 2022-01-01 RX ADMIN — IPRATROPIUM BROMIDE AND ALBUTEROL SULFATE 1 AMPULE: .5; 2.5 SOLUTION RESPIRATORY (INHALATION) at 09:12

## 2022-01-01 RX ADMIN — ALPRAZOLAM 0.25 MG: 0.25 TABLET ORAL at 21:46

## 2022-01-01 RX ADMIN — WATER 2000 MG: 1 INJECTION INTRAMUSCULAR; INTRAVENOUS; SUBCUTANEOUS at 21:48

## 2022-01-01 RX ADMIN — PREDNISONE 40 MG: 20 TABLET ORAL at 09:59

## 2022-01-01 RX ADMIN — MORPHINE SULFATE 2 MG: 2 INJECTION, SOLUTION INTRAMUSCULAR; INTRAVENOUS at 16:00

## 2022-01-01 RX ADMIN — IPRATROPIUM BROMIDE AND ALBUTEROL SULFATE 1 AMPULE: .5; 2.5 SOLUTION RESPIRATORY (INHALATION) at 16:28

## 2022-01-01 RX ADMIN — WATER 2000 MG: 1 INJECTION INTRAMUSCULAR; INTRAVENOUS; SUBCUTANEOUS at 21:45

## 2022-01-01 RX ADMIN — PREDNISONE 40 MG: 20 TABLET ORAL at 08:07

## 2022-01-01 RX ADMIN — ACETAMINOPHEN 650 MG: 325 TABLET ORAL at 10:02

## 2022-01-01 RX ADMIN — Medication 500 MG: at 20:40

## 2022-01-01 RX ADMIN — ALPRAZOLAM 0.25 MG: 0.25 TABLET ORAL at 18:06

## 2022-01-01 RX ADMIN — ACETAMINOPHEN 650 MG: 325 TABLET ORAL at 17:47

## 2022-01-01 RX ADMIN — SODIUM CHLORIDE, PRESERVATIVE FREE 10 ML: 5 INJECTION INTRAVENOUS at 21:15

## 2022-01-01 RX ADMIN — MORPHINE SULFATE 4 MG: 4 INJECTION, SOLUTION INTRAMUSCULAR; INTRAVENOUS at 01:30

## 2022-01-01 RX ADMIN — GLYCOPYRROLATE 0.2 MG: 0.2 INJECTION INTRAMUSCULAR; INTRAVENOUS at 23:36

## 2022-01-01 RX ADMIN — MORPHINE SULFATE 4 MG: 4 INJECTION, SOLUTION INTRAMUSCULAR; INTRAVENOUS at 04:08

## 2022-01-01 RX ADMIN — MORPHINE SULFATE 4 MG: 4 INJECTION, SOLUTION INTRAMUSCULAR; INTRAVENOUS at 20:04

## 2022-01-01 RX ADMIN — MORPHINE SULFATE 4 MG: 4 INJECTION, SOLUTION INTRAMUSCULAR; INTRAVENOUS at 21:57

## 2022-01-01 RX ADMIN — MORPHINE SULFATE 4 MG: 4 INJECTION, SOLUTION INTRAMUSCULAR; INTRAVENOUS at 20:44

## 2022-01-01 RX ADMIN — SODIUM CHLORIDE, PRESERVATIVE FREE 10 ML: 5 INJECTION INTRAVENOUS at 08:27

## 2022-01-01 RX ADMIN — Medication 0.5 MG: at 06:14

## 2022-01-01 RX ADMIN — PREDNISONE 40 MG: 20 TABLET ORAL at 08:27

## 2022-01-01 RX ADMIN — PREDNISONE 40 MG: 20 TABLET ORAL at 09:57

## 2022-01-01 RX ADMIN — IPRATROPIUM BROMIDE AND ALBUTEROL SULFATE 1 AMPULE: .5; 2.5 SOLUTION RESPIRATORY (INHALATION) at 12:37

## 2022-01-01 RX ADMIN — MORPHINE SULFATE 4 MG: 4 INJECTION, SOLUTION INTRAMUSCULAR; INTRAVENOUS at 17:58

## 2022-01-01 RX ADMIN — ENOXAPARIN SODIUM 30 MG: 100 INJECTION SUBCUTANEOUS at 08:27

## 2022-01-01 RX ADMIN — SODIUM CHLORIDE, PRESERVATIVE FREE 10 ML: 5 INJECTION INTRAVENOUS at 21:45

## 2022-01-01 RX ADMIN — CEFTRIAXONE SODIUM 2000 MG: 10 INJECTION, POWDER, FOR SOLUTION INTRAVENOUS at 21:38

## 2022-01-01 RX ADMIN — MORPHINE SULFATE 4 MG: 4 INJECTION, SOLUTION INTRAMUSCULAR; INTRAVENOUS at 02:04

## 2022-01-01 RX ADMIN — SODIUM CHLORIDE, PRESERVATIVE FREE 10 ML: 5 INJECTION INTRAVENOUS at 21:13

## 2022-01-01 RX ADMIN — ALPRAZOLAM 0.5 MG: 0.5 TABLET ORAL at 15:47

## 2022-01-01 RX ADMIN — MORPHINE SULFATE 4 MG: 4 INJECTION, SOLUTION INTRAMUSCULAR; INTRAVENOUS at 06:14

## 2022-01-01 RX ADMIN — KETOROLAC TROMETHAMINE 15 MG: 30 INJECTION, SOLUTION INTRAMUSCULAR; INTRAVENOUS at 13:33

## 2022-01-01 RX ADMIN — POLYETHYLENE GLYCOL 3350 17 G: 17 POWDER, FOR SOLUTION ORAL at 17:54

## 2022-01-01 RX ADMIN — IPRATROPIUM BROMIDE AND ALBUTEROL SULFATE 1 AMPULE: .5; 2.5 SOLUTION RESPIRATORY (INHALATION) at 19:44

## 2022-01-01 RX ADMIN — VANCOMYCIN HYDROCHLORIDE 1000 MG: 1 INJECTION, SOLUTION INTRAVENOUS at 15:40

## 2022-01-01 RX ADMIN — ZOLPIDEM TARTRATE 5 MG: 5 TABLET ORAL at 21:45

## 2022-01-01 RX ADMIN — SODIUM PHOSPHATE, DIBASIC AND SODIUM PHOSPHATE, MONOBASIC 1 ENEMA: 7; 19 ENEMA RECTAL at 18:14

## 2022-01-01 RX ADMIN — PREDNISONE 40 MG: 20 TABLET ORAL at 07:38

## 2022-01-01 RX ADMIN — ACETAMINOPHEN 650 MG: 325 TABLET ORAL at 08:27

## 2022-01-01 RX ADMIN — ZOLPIDEM TARTRATE 5 MG: 5 TABLET ORAL at 21:36

## 2022-01-01 RX ADMIN — Medication 0.5 MG: at 02:04

## 2022-01-01 RX ADMIN — MORPHINE SULFATE 4 MG: 4 INJECTION, SOLUTION INTRAMUSCULAR; INTRAVENOUS at 04:38

## 2022-01-01 RX ADMIN — MORPHINE SULFATE 2 MG: 2 INJECTION, SOLUTION INTRAMUSCULAR; INTRAVENOUS at 14:13

## 2022-01-01 RX ADMIN — MORPHINE SULFATE 4 MG: 4 INJECTION, SOLUTION INTRAMUSCULAR; INTRAVENOUS at 07:38

## 2022-01-01 RX ADMIN — SODIUM CHLORIDE, PRESERVATIVE FREE 10 ML: 5 INJECTION INTRAVENOUS at 09:03

## 2022-01-01 RX ADMIN — Medication 500 MG: at 21:44

## 2022-01-01 RX ADMIN — ACETAMINOPHEN 650 MG: 325 TABLET ORAL at 18:03

## 2022-01-01 RX ADMIN — MORPHINE SULFATE 2 MG: 2 INJECTION, SOLUTION INTRAMUSCULAR; INTRAVENOUS at 01:59

## 2022-01-01 RX ADMIN — MORPHINE SULFATE 4 MG: 4 INJECTION, SOLUTION INTRAMUSCULAR; INTRAVENOUS at 16:21

## 2022-01-01 RX ADMIN — MORPHINE SULFATE 2 MG: 2 INJECTION, SOLUTION INTRAMUSCULAR; INTRAVENOUS at 11:13

## 2022-01-01 RX ADMIN — SODIUM CHLORIDE, PRESERVATIVE FREE 10 ML: 5 INJECTION INTRAVENOUS at 12:45

## 2022-01-01 RX ADMIN — CEFTRIAXONE SODIUM 2000 MG: 10 INJECTION, POWDER, FOR SOLUTION INTRAVENOUS at 20:10

## 2022-01-01 RX ADMIN — Medication 500 MG: at 21:45

## 2022-01-01 RX ADMIN — MORPHINE SULFATE 2 MG: 2 INJECTION, SOLUTION INTRAMUSCULAR; INTRAVENOUS at 04:39

## 2022-01-01 RX ADMIN — MORPHINE SULFATE 4 MG: 4 INJECTION, SOLUTION INTRAMUSCULAR; INTRAVENOUS at 22:20

## 2022-01-01 RX ADMIN — MORPHINE SULFATE 4 MG: 4 INJECTION, SOLUTION INTRAMUSCULAR; INTRAVENOUS at 21:37

## 2022-01-01 RX ADMIN — Medication 0.5 MG: at 22:19

## 2022-01-01 RX ADMIN — DEXTROMETHORPHAN HYDROBROMIDE, GUAIFENESIN 10 ML: 10; 100 LIQUID ORAL at 02:58

## 2022-01-01 RX ADMIN — MORPHINE SULFATE 4 MG: 4 INJECTION, SOLUTION INTRAMUSCULAR; INTRAVENOUS at 13:17

## 2022-01-01 RX ADMIN — ALBUTEROL SULFATE 2 PUFF: 90 AEROSOL, METERED RESPIRATORY (INHALATION) at 20:33

## 2022-01-01 RX ADMIN — IPRATROPIUM BROMIDE AND ALBUTEROL SULFATE 1 AMPULE: .5; 2.5 SOLUTION RESPIRATORY (INHALATION) at 12:50

## 2022-01-01 RX ADMIN — ALPRAZOLAM 0.5 MG: 0.5 TABLET ORAL at 11:13

## 2022-01-01 RX ADMIN — MORPHINE SULFATE 4 MG: 4 INJECTION, SOLUTION INTRAMUSCULAR; INTRAVENOUS at 01:04

## 2022-01-01 RX ADMIN — IPRATROPIUM BROMIDE AND ALBUTEROL SULFATE 1 AMPULE: .5; 2.5 SOLUTION RESPIRATORY (INHALATION) at 07:35

## 2022-01-01 RX ADMIN — MORPHINE SULFATE 2 MG: 2 INJECTION, SOLUTION INTRAMUSCULAR; INTRAVENOUS at 17:45

## 2022-01-01 RX ADMIN — DEXTROMETHORPHAN HYDROBROMIDE, GUAIFENESIN 10 ML: 10; 100 LIQUID ORAL at 13:47

## 2022-01-01 RX ADMIN — ALBUTEROL SULFATE 2 PUFF: 90 AEROSOL, METERED RESPIRATORY (INHALATION) at 15:47

## 2022-01-01 RX ADMIN — ALPRAZOLAM 0.5 MG: 0.5 TABLET ORAL at 13:23

## 2022-01-01 RX ADMIN — CEFTRIAXONE SODIUM 2000 MG: 10 INJECTION, POWDER, FOR SOLUTION INTRAVENOUS at 21:52

## 2022-01-01 RX ADMIN — MORPHINE SULFATE 2 MG: 2 INJECTION, SOLUTION INTRAMUSCULAR; INTRAVENOUS at 09:02

## 2022-01-01 RX ADMIN — ALPRAZOLAM 0.5 MG: 0.5 TABLET ORAL at 07:38

## 2022-01-01 RX ADMIN — Medication 500 MG: at 21:48

## 2022-01-01 RX ADMIN — IOPAMIDOL 75 ML: 755 INJECTION, SOLUTION INTRAVENOUS at 14:49

## 2022-01-01 RX ADMIN — ALPRAZOLAM 0.25 MG: 0.25 TABLET ORAL at 05:07

## 2022-01-01 RX ADMIN — ALPRAZOLAM 0.5 MG: 0.5 TABLET ORAL at 04:38

## 2022-01-01 RX ADMIN — IPRATROPIUM BROMIDE AND ALBUTEROL SULFATE 1 AMPULE: .5; 2.5 SOLUTION RESPIRATORY (INHALATION) at 08:48

## 2022-01-01 RX ADMIN — SODIUM CHLORIDE, PRESERVATIVE FREE 10 ML: 5 INJECTION INTRAVENOUS at 02:05

## 2022-01-01 RX ADMIN — ALPRAZOLAM 0.25 MG: 0.25 TABLET ORAL at 03:37

## 2022-01-01 ASSESSMENT — PAIN SCALES - GENERAL
PAINLEVEL_OUTOF10: 0
PAINLEVEL_OUTOF10: 2
PAINLEVEL_OUTOF10: 7
PAINLEVEL_OUTOF10: 5
PAINLEVEL_OUTOF10: 10
PAINLEVEL_OUTOF10: 3
PAINLEVEL_OUTOF10: 5
PAINLEVEL_OUTOF10: 8
PAINLEVEL_OUTOF10: 7
PAINLEVEL_OUTOF10: 3
PAINLEVEL_OUTOF10: 4
PAINLEVEL_OUTOF10: 6
PAINLEVEL_OUTOF10: 8
PAINLEVEL_OUTOF10: 8
PAINLEVEL_OUTOF10: 2
PAINLEVEL_OUTOF10: 6
PAINLEVEL_OUTOF10: 5
PAINLEVEL_OUTOF10: 6
PAINLEVEL_OUTOF10: 6
PAINLEVEL_OUTOF10: 2
PAINLEVEL_OUTOF10: 2
PAINLEVEL_OUTOF10: 6
PAINLEVEL_OUTOF10: 7
PAINLEVEL_OUTOF10: 6
PAINLEVEL_OUTOF10: 6
PAINLEVEL_OUTOF10: 8
PAINLEVEL_OUTOF10: 8
PAINLEVEL_OUTOF10: 2
PAINLEVEL_OUTOF10: 0

## 2022-01-01 ASSESSMENT — ENCOUNTER SYMPTOMS
DIARRHEA: 0
ABDOMINAL PAIN: 0
SORE THROAT: 0
COUGH: 0
RHINORRHEA: 0
SHORTNESS OF BREATH: 1
TACHYPNEA: 1
VOMITING: 0
NAUSEA: 0

## 2022-01-01 ASSESSMENT — PAIN DESCRIPTION - FREQUENCY
FREQUENCY: CONTINUOUS
FREQUENCY: CONTINUOUS
FREQUENCY: INTERMITTENT

## 2022-01-01 ASSESSMENT — PAIN DESCRIPTION - LOCATION
LOCATION: OTHER (COMMENT)
LOCATION: CHEST;GENERALIZED
LOCATION: HEAD
LOCATION: HEAD
LOCATION: GENERALIZED
LOCATION: CHEST;GENERALIZED
LOCATION: NOSE
LOCATION: CHEST
LOCATION: GENERALIZED
LOCATION: CHEST
LOCATION: CHEST;GENERALIZED
LOCATION: HEAD
LOCATION: CHEST
LOCATION: GENERALIZED
LOCATION: HEAD
LOCATION: GENERALIZED
LOCATION: HEAD
LOCATION: HEAD
LOCATION: GENERALIZED

## 2022-01-01 ASSESSMENT — PAIN - FUNCTIONAL ASSESSMENT
PAIN_FUNCTIONAL_ASSESSMENT: PREVENTS OR INTERFERES SOME ACTIVE ACTIVITIES AND ADLS
PAIN_FUNCTIONAL_ASSESSMENT: PREVENTS OR INTERFERES WITH MANY ACTIVE NOT PASSIVE ACTIVITIES
PAIN_FUNCTIONAL_ASSESSMENT: PREVENTS OR INTERFERES SOME ACTIVE ACTIVITIES AND ADLS
PAIN_FUNCTIONAL_ASSESSMENT: PREVENTS OR INTERFERES SOME ACTIVE ACTIVITIES AND ADLS
PAIN_FUNCTIONAL_ASSESSMENT: PREVENTS OR INTERFERES WITH MANY ACTIVE NOT PASSIVE ACTIVITIES
PAIN_FUNCTIONAL_ASSESSMENT: PREVENTS OR INTERFERES SOME ACTIVE ACTIVITIES AND ADLS
PAIN_FUNCTIONAL_ASSESSMENT: PREVENTS OR INTERFERES WITH MANY ACTIVE NOT PASSIVE ACTIVITIES
PAIN_FUNCTIONAL_ASSESSMENT: PREVENTS OR INTERFERES SOME ACTIVE ACTIVITIES AND ADLS

## 2022-01-01 ASSESSMENT — PAIN DESCRIPTION - DESCRIPTORS
DESCRIPTORS: ACHING
DESCRIPTORS: ACHING;DISCOMFORT;TIGHTNESS
DESCRIPTORS: ACHING;DISCOMFORT;TIGHTNESS
DESCRIPTORS: OTHER (COMMENT)
DESCRIPTORS: ACHING;DISCOMFORT;TIGHTNESS
DESCRIPTORS: ACHING
DESCRIPTORS: ACHING;DISCOMFORT;TIGHTNESS
DESCRIPTORS: ACHING
DESCRIPTORS: DISCOMFORT
DESCRIPTORS: ACHING;DISCOMFORT;TIGHTNESS
DESCRIPTORS: ACHING;DISCOMFORT;TIGHTNESS
DESCRIPTORS: ACHING;DISCOMFORT

## 2022-01-01 ASSESSMENT — PAIN SCALES - WONG BAKER
WONGBAKER_NUMERICALRESPONSE: 0
WONGBAKER_NUMERICALRESPONSE: 6
WONGBAKER_NUMERICALRESPONSE: 0
WONGBAKER_NUMERICALRESPONSE: 4

## 2022-01-01 ASSESSMENT — PAIN DESCRIPTION - ORIENTATION
ORIENTATION: RIGHT;LEFT
ORIENTATION: MID;RIGHT;LEFT
ORIENTATION: MID
ORIENTATION: RIGHT;LEFT

## 2022-01-01 ASSESSMENT — PAIN DESCRIPTION - PAIN TYPE
TYPE: CHRONIC PAIN
TYPE: ACUTE PAIN
TYPE: CHRONIC PAIN

## 2022-01-01 ASSESSMENT — PAIN DESCRIPTION - ONSET: ONSET: ON-GOING

## 2022-09-10 PROBLEM — J84.112 IPF (IDIOPATHIC PULMONARY FIBROSIS) (HCC): Status: ACTIVE | Noted: 2022-01-01

## 2022-09-10 PROBLEM — N30.00 ACUTE CYSTITIS WITHOUT HEMATURIA: Status: ACTIVE | Noted: 2022-01-01

## 2022-09-10 PROBLEM — J84.9 ILD (INTERSTITIAL LUNG DISEASE) (HCC): Status: ACTIVE | Noted: 2022-01-01

## 2022-09-10 PROBLEM — A41.9 SEPSIS (HCC): Status: ACTIVE | Noted: 2022-01-01

## 2022-09-10 NOTE — ED NOTES
Pt requesting to be taken of NRB to NC. Writer spoke with provider. Moved pt from 10L NRB to 8L by NC.  Pt maintaining O2 at 100%     Brianda Messer RN  09/10/22 9800

## 2022-09-10 NOTE — ED PROVIDER NOTES
[facility]     Emergency Department     Faculty Attestation    I performed a history and physical examination of the patient and discussed management with the resident. I reviewed the resident´s note and agree with the documented findings and plan of care. Any areas of disagreement are noted on the chart. I was personally present for the key portions of any procedures. I have documented in the chart those procedures where I was not present during the key portions. I have reviewed the emergency nurses triage note. I agree with the chief complaint, past medical history, past surgical history, allergies, medications, social and family history as documented unless otherwise noted below. For Physician Assistant/ Nurse Practitioner cases/documentation I have personally evaluated this patient and have completed at least one if not all key elements of the E/M (history, physical exam, and MDM). Additional findings are as noted. [facility] from Put in [place] for shortness of breath, patient is DNR CC A,  Rhonchi on the right chest, tachypneic, tachycardic, abdomen soft and nontender, no lower extremity pain or swelling on examination. Septic work-up.      Joanie Phillips MD  09/10/22 [id number]

## 2022-09-10 NOTE — ACP (ADVANCE CARE PLANNING)
Advance Care Planning     Advance Care Planning Activator (Inpatient)  Conversation Note      Date of ACP Conversation: 9/10/2022     Conversation Conducted with: Patient with Decision Making Capacity    ACP Activator: Dequan Gallardo RN    Pt is a DNR CC prior to admission, clarified with pt and daughter, both would like to have her remain a DNR CC code status, [address] 84  Resident notified to complete paperwork for Code Status. Daughter relates she is medical POA and will bring copy of paperwork in    [address]:     Current Designated Health Care Decision Maker:     Click here to complete [address] including section of the [address] Relationship (ie \"Primary\")  Today we documented Decision Maker(s) consistent with Legal Next of Kin hierarchy. Care Preferences    Ventilation: \"If you were in your present state of health and suddenly became very ill and were unable to breathe on your own, what would your preference be about the use of a ventilator (breathing machine) if it were available to you? \"      Would the patient desire the use of ventilator (breathing machine)?: no    \"If your health worsens and it becomes clear that your chance of recovery is unlikely, what would your preference be about the use of a ventilator (breathing machine) if it were available to you? \"     Would the patient desire the use of ventilator (breathing machine)?: No      Resuscitation  \"CPR works best to restart the heart when there is a sudden event, like a heart attack, in someone who is otherwise healthy. Unfortunately, CPR does not typically restart the heart for people who have serious health conditions or who are very sick. \"    \"In the event your heart stopped as a result of an underlying serious health condition, would you want attempts to be made to restart your heart (answer \"yes\" for attempt to resuscitate) or would you prefer a natural death (answer \"no\" for do not attempt to resuscitate)? \" no       [x] Yes   [] No   Educated Patient / Keira Avila regarding differences between Advance Directives and portable DNR orders.     Length of ACP Conversation in minutes:      Conversation Outcomes:  [x] ACP discussion completed  [] Existing advance directive reviewed with patient; no changes to patient's previously recorded wishes  [] New Advance Directive completed  [] Portable Do Not Rescitate prepared for Provider review and signature  [] POLST/POST/MOLST/MOST prepared for Provider review and signature      Follow-up plan:    [x] Schedule follow-up conversation to continue planning  [] Referred individual to Provider for additional questions/concerns   [] Advised patient/agent/surrogate to review completed ACP document and update if needed with changes in condition, patient preferences or care setting    [] This note routed to one or more involved healthcare providers    As above

## 2022-09-10 NOTE — PROGRESS NOTES
[address]  Flight Physician       Pt Name: Eunice Jauregui  MRN: [id number]  YOB: 1935  Date of evaluation: 9/10/22  PCP:  No primary care provider on file. REASON FOR FLIGHT      Patient was transported from [facility] to [facility] Emergency Department due to respiratory distress. Flight was indicated in order to reduce risk of morbidity/mortality. HISTORY OF PRESENT ILLNESS     Eunice Jauregui is a 81 y.o. female who was seen by put in by EMS due to difficulty breathing for the last 72 hours. Patient was seen yesterday and evaluated and a nonrebreather was left at home for her due to worsening respiratory status. Patient has history of COPD and asthma. Denies any recent illnesses though she does state that she has been coughing frequently. Patient is normally on oxygen at home but is unable to tell me how much oxygen she takes at home. At outside hospital and was discharged home recently and her shortness of breath continues to get worse. Patient does admit to having COVID back in May 2021. She denies any chest pain nausea or vomiting she denies any leg swelling. [facility] O2    PHYSICAL EXAM        Physical Exam  Constitutional:       General: She is not in acute distress. Comments: Thin   HENT:      Head: Normocephalic and atraumatic. Right Ear: External ear normal.      Left Ear: External ear normal.      Nose: Nose normal. No rhinorrhea. Eyes:      Extraocular Movements: Extraocular movements intact. Pupils: Pupils are equal, round, and reactive to light. Cardiovascular:      Rate and Rhythm: Regular rhythm. Tachycardia present. Pulses: Normal pulses. Heart sounds: No murmur heard. Pulmonary:      Effort: Tachypnea and accessory muscle usage present. No respiratory distress. Breath sounds: Examination of the right-upper field reveals rhonchi.  Examination of the left-upper field reveals rhonchi. Examination of the right-middle field reveals rhonchi. Examination of the left-middle field reveals rhonchi. Examination of the right-lower field reveals rhonchi. Examination of the left-lower field reveals rhonchi. Rhonchi present. No wheezing or rales. Comments: On NRB 10L   Abdominal:      Palpations: Abdomen is soft. Tenderness: There is no abdominal tenderness. Musculoskeletal:         General: No swelling. Normal range of motion. Cervical back: Normal range of motion and neck supple. Right lower leg: No edema. Left lower leg: No edema. Skin:     General: Skin is warm and dry. Neurological:      General: No focal deficit present. Mental Status: She is alert and oriented to person, place, and time. MDM     Patient is a 81-year-old female with past medical history of COPD and asthma who called out today due to progressively worsening shortness of breath that has been ongoing for the last 72 hours. Patient has been using her breathing treatments and oxygen at home without any improvement. Patient requiring 10 L nonrebreather to maintain oxygen saturations. Of note patient is a DNR CCA. Patient has harsh breath sounds noted in the lung fields, no wheezing present. She is slightly tachycardic with sinus tachycardia and tachypnea mild increased work of breathing. Continued on nonrebreather in route. She was taken to [facility] emergency department for further work-up and evaluation of her dyspnea. PROCEDURES:  PIV placement     CRITICAL CARE:  30 minutes     Destination     Patient arrived at [facility] Emergency Department in stable condition no significant changes in flight, all questions receiving staff had were answered.        Vivien Castillo, DO  [facility] Physician     (Please note that portions of this note were completed with a voice recognition program.  Efforts were made to edit the dictations but occasionally words are mis-transcribed.)

## 2022-09-10 NOTE — ED PROVIDER NOTES
[address] [facility]  ED  Emergency Department Encounter  EmergencyMedicine Resident     Pt Name:Shaniqua Ulloa  MRN: [id number]  [place] 1935  Date of evaluation: 9/10/22  PCP:  No primary care provider on file. CHIEF COMPLAINT       Chief Complaint   Patient presents with    Shortness of Breath     Pt via [facility] for eval of SOB. Pt has HX of COPD. Pt was seen earlier this week and sent home. Pt sent home or given O2 by other facility. Pt over few days became increasingly SOB. Pt arrived on NRB @ 10L. Pt tachycardic and tachypneic upon arrival.        HISTORY OF PRESENT ILLNESS  (Location/Symptom, Timing/Onset, Context/Setting, Quality, Duration, Modifying Factors, Severity.)      Santiago Goodman is a 81 y.o. female who presents with shortness of breath. Patient on arrival by [facility] from [facility] and states she has been short of breath for the past several days. Per [facility], patient was found in her home not attached to nasal cannula. Patient was placed on 10 L nonrebreather by [facility] with increased SPO2 to 100%. Patient alert and oriented, answering questions appropriately. Daughter later at patient's bedside does state patient has been having some memory difficulties. Patient was recently seen at [address] [place] [address] 3 days ago for similar symptoms. She also had a recent heart catheterization at [place] [id number] per her daughter. Patient has oxygen at home on a compressor that patient needs to trigger to deliver oxygen, is not continuous oxygen. Upon further clarification, patient states she occasionally places a nasal cannula in her nose for several seconds a few times a day maximally. She is not regularly wearing oxygen. Patient's daughter states she had been relatively independent on [address] [facility] [facility] but has had several episodes of shortness of breath this year and over the past 2 weeks has become increasingly hypoxic and confused despite increased care from her family members. Unknown if patient is taking her medications regularly. Patient's daughter endorses decreased p.o. intake and increased falls over the past several weeks, and also states the patient has had some possible urinary retention with decreased urine production over the past few weeks. She denies any fevers, productive cough, headache, changes in vision, abdominal pain, nausea, vomiting, diarrhea, numbness, tingling, weakness. PAST MEDICAL / SURGICAL / SOCIAL / FAMILY HISTORY      has no past medical history on file. COPD, chronic respiratory failure with hypoxia, interstitial lung disease, nonrheumatic aortic valve insufficiency     has no past surgical history on file. Recent cardiac catheterization    Social History     Socioeconomic History    Marital status:      Spouse name: Not on file    Number of children: Not on file    Years of education: Not on file    Highest education level: Not on file   Occupational History    Not on file   Tobacco Use    Smoking status: Not on file    Smokeless tobacco: Not on file   Substance and Sexual Activity    Alcohol use: Not on file    Drug use: Not on file    Sexual activity: Not on file   Other Topics Concern    Not on file   Social History Narrative    Not on file     Social Determinants of Health     Financial Resource Strain: Not on file   Food Insecurity: Not on file   Transportation Needs: Not on file   Physical Activity: Not on file   Stress: Not on file   Social Connections: Not on file   Intimate Partner Violence: Not on file   Housing Stability: Not on file       No family history on file. Allergies:  Patient has no known allergies. Home Medications:  Prior to Admission medications    Not on File       REVIEW OF SYSTEMS    (2-9 systems for level 4, 10 or more for level 5)      Review of Systems   Constitutional:  Positive for appetite change and fatigue. Negative for chills and fever. HENT:  Negative for congestion, rhinorrhea and sore throat. Eyes:  Negative for visual disturbance. Respiratory:  Positive for shortness of breath. Negative for cough. Cardiovascular:  Negative for chest pain. Gastrointestinal:  Negative for abdominal pain, diarrhea, nausea and vomiting. Genitourinary:  Positive for difficulty urinating. Negative for dysuria, flank pain and hematuria. Musculoskeletal:  Negative for arthralgias and myalgias. Skin:  Negative for rash. Neurological:  Negative for dizziness, tremors, syncope, weakness, light-headedness, numbness and headaches. All other systems reviewed and are negative. PHYSICAL EXAM   (up to 7 for level 4, 8 or more for level 5)      INITIAL VITALS:   BP (!) 136/59   Pulse 78   Temp 97.8 °F (36.6 °C) (Oral)   Resp 19   Ht 5' 1\" (1.549 m)   Wt 110 lb (49.9 kg)   SpO2 100%   BMI 20.78 kg/m²     Physical Exam  Vitals reviewed. Constitutional:       General: She is not in acute distress. HENT:      Head: Normocephalic. Mouth/Throat:      Pharynx: Oropharynx is clear. Eyes:      Extraocular Movements: Extraocular movements intact. Pupils: Pupils are equal, round, and reactive to light. Cardiovascular:      Rate and Rhythm: Regular rhythm. Tachycardia present. Pulses: Normal pulses. Heart sounds: Normal heart sounds. Pulmonary:      Effort: Pulmonary effort is normal.      Breath sounds: Examination of the right-middle field reveals rhonchi. Examination of the right-lower field reveals rhonchi. Rhonchi present. No decreased breath sounds, wheezing or rales. Abdominal:      Palpations: Abdomen is soft. Tenderness: There is no abdominal tenderness. There is no guarding or rebound. Musculoskeletal:         General: Normal range of motion. Cervical back: Normal range of motion and neck supple. Skin:     Capillary Refill: Capillary refill takes less than 2 seconds. Coloration: Skin is pale. Findings: No rash.    Neurological:      General: No focal deficit present. Mental Status: She is alert and oriented to person, place, and time. DIFFERENTIAL  DIAGNOSIS     PLAN (LABS / IMAGING / EKG):  Orders Placed This Encounter   Procedures    Culture, Blood 1    Culture, Blood 1    Culture, Urine    COVID-19, Rapid    XR CHEST PORTABLE    CT CHEST PULMONARY EMBOLISM W CONTRAST    CBC with Auto Differential    Comprehensive Metabolic Panel    Lactate, Sepsis    Urinalysis with Microscopic    D-Dimer, Quantitative    Troponin    Inpatient consult to Internal Medicine    Initiate Oxygen Therapy Protocol    EKG 12 Lead    ADMIT TO INPATIENT    ADMIT TO INPATIENT       MEDICATIONS ORDERED:  Orders Placed This Encounter   Medications    0.9 % sodium chloride bolus    vancomycin (VANCOCIN) 1000 mg in dextrose 5% 200 mL IVPB     Order Specific Question:   Antimicrobial Indications     Answer:   Sepsis of Unknown Etiology    piperacillin-tazobactam (ZOSYN) 3,375 mg in dextrose 5 % 50 mL IVPB (mini-bag)     Order Specific Question:   Antimicrobial Indications     Answer:   Sepsis of Unknown Etiology    iopamidol (ISOVUE-370) 76 % injection 75 mL    OR Linked Order Group     acetaminophen (TYLENOL) tablet 650 mg     acetaminophen (TYLENOL) suppository 650 mg       DDX: COPD exacerbation, Covid, asthma, pneumothorax, anxiety, PE , pericardial effusion, CHF, ACS/MI, atelectasis, lower airway obstruction, aspiration    DIAGNOSTIC RESULTS / EMERGENCY DEPARTMENT COURSE / MDM   LAB RESULTS:  Results for orders placed or performed during the hospital encounter of 09/10/22   Culture, Blood 1    Specimen: Blood   Result Value Ref Range    Specimen Description . BLOOD     Special Requests 20ML LT AC     Culture NO GROWTH <24 HRS    COVID-19, Rapid    Specimen: Nasopharyngeal Swab   Result Value Ref Range    Specimen Description . NASOPHARYNGEAL SWAB     SARS-CoV-2, Rapid Not Detected Not Detected   CBC with Auto Differential   Result Value Ref Range    WBC 15.7 (H) 3.5 - 11.3 k/uL    RBC 4. 06 3.95 - 5.11 m/uL    Hemoglobin 12.7 11.9 - 15.1 g/dL    Hematocrit 36.8 36.3 - 47.1 %    MCV 90.6 82.6 - 102.9 fL    MCH 31.3 25.2 - 33.5 pg    MCHC 34.5 28.4 - 34.8 g/dL    RDW 11.8 11.8 - 14.4 %    Platelets 480 203 - 366 k/uL    MPV 8.8 8.1 - 13.5 fL    NRBC Automated 0.0 0.0 per 100 WBC    Immature Granulocytes 1 (H) 0 %    Seg Neutrophils 89 (H) 36 - 65 %    Lymphocytes 3 (L) 24 - 43 %    Monocytes 6 3 - 12 %    Eosinophils % 1 1 - 4 %    Basophils 0 0 - 2 %    Absolute Immature Granulocyte 0.16 0.00 - 0.30 k/uL    Segs Absolute 13.97 (H) 1.50 - 8.10 k/uL    Absolute Lymph # 0.47 (L) 1.10 - 3.70 k/uL    Absolute Mono # 0.94 0.10 - 1.20 k/uL    Absolute Eos # 0.16 0.00 - 0.44 k/uL    Basophils Absolute 0.00 0.00 - 0.20 k/uL    Morphology Normal    Comprehensive Metabolic Panel   Result Value Ref Range    Glucose 104 (H) 70 - 99 mg/dL    BUN 15 8 - 23 mg/dL    Creatinine 0.72 0.50 - 0.90 mg/dL    Calcium 8.4 (L) 8.6 - 10.4 mg/dL    Sodium 131 (L) 135 - 144 mmol/L    Potassium 4.4 3.7 - 5.3 mmol/L    Chloride 91 (L) 98 - 107 mmol/L    CO2 30 20 - 31 mmol/L    Anion Gap 10 9 - 17 mmol/L    Alkaline Phosphatase 71 35 - 104 U/L    ALT 15 5 - 33 U/L    AST 31 <32 U/L    Total Bilirubin 0.5 0.3 - 1.2 mg/dL    Total Protein 7.0 6.4 - 8.3 g/dL    Albumin 3.4 (L) 3.5 - 5.2 g/dL    Albumin/Globulin Ratio 0.9 (L) 1.0 - 2.5    GFR Non-African American >60 >60 mL/min    GFR African American >60 >60 mL/min    GFR Comment         Lactate, Sepsis   Result Value Ref Range    Lactic Acid, Sepsis, Whole Blood 1.3 0.5 - 1.9 mmol/L   Lactate, Sepsis   Result Value Ref Range    Lactic Acid, Sepsis, Whole Blood 0.7 0.5 - 1.9 mmol/L   Urinalysis with Microscopic   Result Value Ref Range    Color, UA Yellow Yellow    Turbidity UA Cloudy (A) Clear    Glucose, Ur NEGATIVE NEGATIVE    Bilirubin Urine NEGATIVE NEGATIVE    Ketones, Urine NEGATIVE NEGATIVE    Specific Gravity, UA 1.016 1.005 - 1.030    Urine Hgb SMALL (A) NEGATIVE    pH, UA 6.5 5.0 - 8.0    Protein, UA 1+ (A) NEGATIVE    Urobilinogen, Urine Normal Normal    Nitrite, Urine POSITIVE (A) NEGATIVE    Leukocyte Esterase, Urine SMALL (A) NEGATIVE    WBC, UA 50  0 - 5 /HPF    RBC, UA 5 TO 10 0 - 4 /HPF    Casts UA  0 - 8 /LPF     5 TO 10 HYALINE Reference range defined for non-centrifuged specimen. Epithelial Cells UA None 0 - 5 /HPF    Bacteria, UA MODERATE (A) None   D-Dimer, Quantitative   Result Value Ref Range    D-Dimer, Quant 4.99 mg/L FEU       IMPRESSION: 81-year-old female presents by [facility] [place] on 10 L nonrebreather. SPO2 at 96 to 100%. Patient reportedly wears oxygen at home but upon further review states she only places a nasal cannula in her nose very intermittently. Recently seen at [facility] for similar symptoms. Increasing falls recently, increased memory difficulty, increasing urinary retention per daughter. Patient endorsing shortness of breath. Patient is tachycardic and tachypneic on arrival.  Patient meeting SIRS criteria. Concern for sepsis with possible pulmonary source. Will obtain broad work-up including lab work-up, cardiac work-up, chest x-ray, dimer, COVID, sepsis work-up, troponin, EKG. Chest x-ray concerning for pneumonia. Patient with positive UA, impression is urosepsis versus pulmonary source. Patient given 30 ml/kg bolus of normal saline and started on Vanco and Zosyn. Patient requesting to be taken off nonrebreather as she states it is uncomfortable, did place patient on 8 L nasal cannula which she tolerated well with overall SPO2 approximately 92%. Patient admitted to internal medicine service for sepsis.       Sepsis Times and Checklist  Vital Signs: BP: (!) 136/59  Heart Rate: 78  Resp: 19  Temp: 97.8 °F (36.6 °C) SpO2: 100 %  SIRS (>2) Non Pregnant       Temp > 38.3C or < 36C   HR > 90   RR > 20   WBC > 12 or < 4 or >10% bands  SIRS (>2) Pregnant 20 weeks until 3 days postpartum   Temp > 38C or <36C   HR >110   RR > 24   WBC >15 or < 4 or >10% bands   SIRS (>2) and confirmed or suspected source of infection = Sepsis  Is Sepsis due to likely bacterial infection?: Yes    Sepsis Identified:  Date: 9/10/2022   Time: 1323  Sepsis Orders:   CBC(required): Yes   CMP: Yes   PT/PTT: No   Blood Cultures x2(required): Yes   Urinalysis and Urine Culture: Yes   Lactate(required): Yes   Antibiotics Given (within 3 hours of sepsis identification, after blood cultures):  Yes    (If unable to obtain IV access for IV antibiotics within 3 hours of identification of sepsis, IM antibiotics is acceptable.)              If lactate >2.0 MUST repeat within 6 hours    If elevated, is elevated lactate from a likely infectious source?: Yes. IV Fluid Bolus:  Is lactate > 4.0:  No  If lactate >  4.0 OR hypotension (MAP<65 mmHg,BP<90 or SBP<85 pregnancy 20 weeks to 3 days  postpartum) (with 2 BP readings) 30 ml/kg crystalloid MUST be ordered. Fluids must be initiated within 3 hours of sepsis identification. These fluids must have a rate > 125 ml/hr. Is the patient Morbidly Obese (BMI > 30): No    RADIOLOGY:  XR CHEST PORTABLE    Result Date: 9/10/2022  EXAMINATION: ONE XRAY VIEW OF THE CHEST 9/10/2022 12:27 pm COMPARISON: None. HISTORY: ORDERING SYSTEM PROVIDED HISTORY: SOB TECHNOLOGIST PROVIDED HISTORY: SOB FINDINGS: There are diffuse pulmonary infiltrates. There is no effusion. There is no pneumothorax. The mediastinal structures are unremarkable. The upper abdomen is unremarkable. The extrathoracic soft tissues are unremarkable. Diffuse pulmonary infiltrates consistent with pneumonia. CT CHEST PULMONARY EMBOLISM W CONTRAST    Result Date: 9/10/2022  EXAMINATION: CTA OF THE CHEST 9/10/2022 11:34 am TECHNIQUE: CTA of the chest was performed after the administration of intravenous contrast.  Multiplanar reformatted images are provided for review. MIP images are provided for review.  Automated exposure control, iterative reconstruction, and/or weight based adjustment of the mA/kV was utilized to reduce the radiation dose to as low as reasonably achievable. COMPARISON: None. HISTORY: ORDERING SYSTEM PROVIDED HISTORY: elevated dimer, increasing O2 requirement. r/o PE TECHNOLOGIST PROVIDED HISTORY: elevated dimer, increasing O2 requirement. r/o PE Decision Support Exception - unselect if not a suspected or confirmed emergency medical condition->Emergency Medical Condition (MA) Reason for Exam: elevated dimer, increasing o2 requirement r/o pe FINDINGS: Pulmonary Arteries: Pulmonary arteries are adequately opacified for evaluation. No evidence of intraluminal filling defect to suggest pulmonary embolism. Main pulmonary artery is normal in caliber. Mediastinum: There are multiple nonspecific lymph nodes seen in the middle mediastinum and gibran. Lungs/pleura: Patchy bilateral interstitial and ground-glass infiltrates with underlying honeycombing and bullous changes. . . No pulmonary masses are noted. No pleural effusions are identified. Cardiomediastinal Structures: Coronary arterial calcifications are seen. Intimal calcifications associated with the thoracic aorta. No evidence of thoracic aortic aneurysm or dissection . Cardiac chambers are mildly enlarged Upper Abdomen: Unremarkable Soft Tissues/Bones: No acute osseous abnormalities. Pulmonary fibrosis with suggestion of an underlying infectious/inflammatory process Coronary artery/atherosclerotic disease No evidence of pulmonary embolism Mediastinal and hilar lymphadenopathy, probably reactive.       EMERGENCY DEPARTMENT COURSE:  ED Course as of 09/10/22 1854   Sat Sep 10, 2022   1323 WBC(!): 15.7 [JG]   1323 Leukocyte Esterase, Urine(!): SMALL [JG]   1323 Nitrite, Urine(!): POSITIVE [JG]   1323 WBC, UA: 50  [JG]   1349 D-Dimer, Quant: 4.99 [JG]   1421 Patient requesting to take off the non-rebreather, will try NC at 8 L [JG]      ED Course User Index  [JG] Tremayne Saba Rafi Olivier MD     CONSULTS:  IP CONSULT TO INTERNAL MEDICINE  IP CONSULT TO CASE MANAGEMENT    FINAL IMPRESSION      1. Sepsis, due to unspecified organism, unspecified whether acute organ dysfunction present [facility])          DISPOSITION / [address] Admitted 09/10/2022 03:30:06 PM      PATIENT REFERRED TO:  No follow-up provider specified.     DISCHARGE MEDICATIONS:  New Prescriptions    No medications on file       Mark Blue MD  Emergency Medicine Resident    (Please note that portions of thisnote were completed with a voice recognition program.  Efforts were made to edit the dictations but occasionally words are mis-transcribed.)       Mark Blue MD  Resident  09/10/22 [id number]

## 2022-09-11 NOTE — CODE DOCUMENTATION
Resuscitation/Code Status Note on Doyline Marshal (YOB: 1935)    At 04:31 am on September 11, 2022, resuscitation/code status decision was based on a thorough discussion with the patient. The code status was made DNR-CCA with no intubation witnessed by Segundo Raman RN.     Electronically signed by Heath Vargas MD on 9/11/22 at 4:31 AM EDT

## 2022-09-11 NOTE — PROGRESS NOTES
Physical Therapy  [facility] Initial Assessment    Name: Anne Paredes  : 1935  MRN: [id number]  Date of Service: 2022  Chief Complaint   Patient presents with    Shortness of Breath     Pt via [facility] for eval of SOB. Pt has HX of COPD. Pt was seen earlier this week and sent home. Pt sent home or given O2 by other facility. Pt over few days became increasingly SOB. Pt arrived on NRB @ 10L. Pt tachycardic and tachypneic upon arrival.      Discharge Recommendations: Further therapy recommended at discharge. PT Equipment Recommendations  Equipment Needed: No      Patient Diagnosis(es): The encounter diagnosis was Sepsis, due to unspecified organism, unspecified whether acute organ dysfunction present ([facility] [address] Past Medical History:  has no past medical history on file. Past Surgical History:  has no past surgical history on file. Assessment   Body Structures, Functions, Activity Limitations Requiring Skilled Therapeutic Intervention: Decreased functional mobility ; Decreased endurance;Decreased balance  Assessment: The pt completed bed mobility with SBA, transfers with CGA and required Min A to maintain standing. Pt c/o SOB with all mobility, SPO2 maintained >92% throughout on 15L via NRB.   Therapy Prognosis: Good  Decision Making: Medium Complexity  Requires PT Follow-Up: Yes  Activity Tolerance  Activity Tolerance: Patient limited by endurance     Plan   Plan  Plan:  (5-6x/wk)  Current Treatment Recommendations: Strengthening, ROM, Balance training, Functional mobility training, Transfer training, Endurance training, Therapeutic activities, Gait training, Equipment evaluation, education, & procurement, Patient/Caregiver education & training, Safety education & training  Safety Devices  Type of Devices: Call light within reach, Left in bed, Nurse notified, Gait belt  Restraints  Restraints Initially in Place: No Restrictions  Restrictions/Precautions  Restrictions/Precautions: Fall Risk, General Precautions, Up as Tolerated  Required Braces or Orthoses?: No  Position Activity Restriction  Other position/activity restrictions: 5L O2 at baseline     Subjective   General  Patient assessed for rehabilitation services?: Yes  Response To Previous Treatment: Not applicable  Family / Caregiver Present: No  Follows Commands: Within Functional Limits  General Comment  Comments: 15L O2 via NRB  Subjective  Subjective: RN and pt agreeable to PT. Pt supine in bed upon arrival, pleasant and cooperative throughout. Social/Functional History  Social/Functional History  Lives With: Alone  Home Layout: One level  Home Access: Stairs to enter with rails  Entrance Stairs - Number of Steps: 3  Entrance Stairs - Rails: Right  Bathroom Shower/Tub: Walk-in shower, Shower chair with back  Bathroom Toilet: Standard  Bathroom Equipment: Grab bars in shower, Shower chair  Bathroom Accessibility: Not accessible  Home Equipment: Rollator, Oxygen (pt reports ambulating without AD in the home, uses rollator for community distances; 5L O2)  Receives Help From: Family  ADL Assistance: Independent  Bath: Supervision  Dressing: Supervision  Grooming: Supervision  Feeding: Supervision  Toileting: Needs assistance  Homemaking Assistance: Needs assistance  Meal Prep: Maximal  Laundry: Moderate  Vacuuming: Moderate  Cleaning: Moderate  Gardening: Maximal  Yard Work: Maximal  Driving: Stand by  Shopping: Moderate  Homemaking Responsibilities: Yes  Ambulation Assistance: Independent  Transfer Assistance: Independent  Active : Yes  Mode of Transportation: Car  Occupation: Retired  Type of Occupation: teacher  Leisure & Hobbies: Enjoys visiting people  Additional Comments: Pt reports difficulty with ADLs and IADLs and recently dtr and neighbors have been providing assistance.   Vision/Hearing  Vision  Vision: Impaired  Vision Exceptions: Wears glasses for reading  Hearing  Hearing: Within functional limits    Cognition   Orientation  Overall Orientation Status: Within Functional Limits  Cognition  Overall Cognitive Status: Exceptions  Arousal/Alertness: Delayed responses to stimuli  Following Commands:  Follows multistep commands with repitition  Attention Span: Attends with cues to redirect  Safety Judgement: Decreased awareness of need for assistance;Decreased awareness of need for safety  Problem Solving: Decreased awareness of errors  Insights: Decreased awareness of deficits  Initiation: Requires cues for some  Cognition Comment: Pt self limiting at times throughout session due to c/o SOB     Objective     Gross Assessment  Tone: Normal  Sensation: Intact (pt denies numbness and tingling)     Joint Mobility  Spine: WFL  ROM RLE: WFL  ROM LLE: WFL  ROM RUE: WFL  ROM LUE: WFL  Strength RLE  Strength RLE: WFL  Strength LLE  Strength LLE: WFL  Strength RUE  Strength RUE: WFL  Strength LUE  Strength LUE: WFL      Bed mobility  Supine to Sit: Stand by assistance  Sit to Supine: Stand by assistance  Scooting: Stand by assistance  Bed Mobility Comments: increased time required, encouragement to continue participation  Transfers  Sit to Stand: Contact guard assistance  Stand to sit: Contact guard assistance  Ambulation  More Ambulation?: No (pt declined due to SOB)     Balance  Posture: Fair  Sitting - Static: Good  Sitting - Dynamic: Good;-  Standing - Static: Fair;-  Comments: sitting EOB 3 minutes with CGA, standing ~30 seconds with Min A to maintain standing; pt requesting to return to supine to SOB- SPO2 maintained 93% or greater            AM-PAC Score  AM-PAC Inpatient Mobility Raw Score : 15 (09/11/22 1433)  AM-PAC Inpatient T-Scale Score : 39.45 (09/11/22 1433)  Mobility Inpatient CMS 0-100% Score: 57.7 (09/11/22 1433)  Mobility Inpatient CMS G-Code Modifier : CK (09/11/22 1433)          Goals  Short Term Goals  Time Frame for Short term goals: 14 visits  Short term goal 1: Perform bed mobility and functional transfers independently  Short term goal 2: Ambulate 300ft independently with least restrictive AD  Short term goal 3: Demo Good- dynamic standing balance to decrease risk of falls  Short term goal 4: Participate in 30 mintues of therapy to demo increased endurance       Education  Patient Education  Education Given To: Patient  Education Provided: Role of Therapy;Plan of Care;Energy Conservation  Education Method: Verbal  Barriers to Learning: None  Education Outcome: Verbalized understanding      Therapy Time   Individual Concurrent Group Co-treatment   Time In 0743         Time Out 0805         Minutes 22         Timed Code Treatment Minutes: 8 Minutes       Leslie Burdick, PT

## 2022-09-11 NOTE — H&P
[address]     Department of Internal Medicine - Staff Internal Medicine Teaching Service          ADMISSION NOTE/HISTORY AND PHYSICAL EXAMINATION   Date: 9/10/2022  Patient Name: Sandrine [place]  Date of admission: 9/10/2022 12:09 PM  YOB: 1935  PCP: No primary care provider on file. History Obtained From:  patient    CHIEF COMPLAINT     Chief complaint: SOB     HISTORY OF PRESENTING ILLNESS     The patient is a pleasant 81 y.o. female with past medical history of interstitial lung disease, COPD, asthma, hyperlipidemia came to the ER via [facility] due to complaints of shortness of breath. For the last few days patient was feeling short of breath. Patient was recently seen at [address] [place] [address] 3 days ago for similar symptoms. Daughter stated that patient has been having increased hypoxic episode for the past 2 weeks and becomes confused. Patient and daughter denies any fever, productive cough, abdominal pain, headache, nausea, vomiting, diarrhea, weakness. Although there is a slight reduction in oral appetite. As per patient's chart, patient is having difficulty urination these days. Patient states that she uses compressor for home oxygen since March 2020. Patient has to switch on the compressor to deliver oxygen and she also sometimes use nasal cannula for several seconds a few times a day. She is not on continuous oxygen at home. Patient states that her hypoxic episode started March 2020. She believed that she had COVID but every test was negative. She went to multiple doctors and eventually was diagnosed with interstitial lung disease. She is taking prednisone 10 mg since 6/29/2022. In the ER, patient was hypoxic and was placed on 10 L nonrebreather mask and slowly wean down. Urine analysis was positive for UTI. Preliminary results from blood cultures were negative. Chest x-ray showed diffuse pulmonary infiltrates concerning for pneumonia.   Lactic acid was normal.  WBC elevated at 15.7. Review of Systems     Constitutional:  Positive for appetite change and fatigue. Negative for chills and fever. HENT:  Negative for congestion, rhinorrhea and sore throat. Eyes:  Negative for visual disturbance. Respiratory:  Positive for shortness of breath. Negative for cough. Cardiovascular:  Negative for chest pain. Gastrointestinal:  Negative for abdominal pain, diarrhea, nausea and vomiting. Genitourinary:  Positive for difficulty urinating. Negative for dysuria, flank pain and hematuria. Musculoskeletal:  Negative for arthralgias and myalgias. Skin:  Negative for rash. Neurological:  Negative for dizziness, tremors, syncope, weakness, light-headedness, numbness and headaches. All other systems reviewed and are negative. PAST MEDICAL HISTORY     No past medical history on file. PAST SURGICAL HISTORY     No past surgical history on file. ALLERGIES     Patient has no known allergies. MEDICATIONS PRIOR TO ADMISSION     Prior to Admission medications    Not on File       SOCIAL HISTORY     Tobacco: Quit smoking 25 years ago. 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 PHYSICAL EXAM     Vitals: BP (!) 136/59   Pulse 78   Temp 97.8 °F (36.6 °C) (Oral)   Resp 19   Ht 5' 1\" (1.549 m)   Wt 110 lb (49.9 kg)   SpO2 100%   BMI 20.78 kg/m²   Tmax: Temp (24hrs), Av.8 °F (36.6 °C), Min:97.8 °F (36.6 °C), Max:97.8 °F (36.6 °C)    Last Body weight:   Wt Readings from Last 3 Encounters:   09/10/22 110 lb (49.9 kg)     Body Mass Index : Body mass index is 20.78 kg/m². PHYSICAL EXAMINATION:  Constitutional: This is a well developed, well nourished,  81y.o. year old female who is alert, oriented, cooperative and in no apparent distress. Head:normocephalic and atraumatic. EENT:  PERRLA. No conjunctival injections.    Septum was midline, mucosa was without erythema, exudates or cobblestoning. No thrush was noted. Neck: Supple without thyromegaly. No elevated JVP. Trachea was midline. Respiratory: Shortness of breath, bilateral crackles, bilateral rhonchi  cardiovascular: Irregular heart rate, tachycardia  abdomen: Slightly rounded and soft without organomegaly. No rebound, rigidity or guarding was appreciated. Lymphatic: No lymphadenopathy. Musculoskeletal: Normal curvature of the spine. No gross muscle weakness. Extremities:  No lower extremity edema, ulcerations, tenderness, varicosities or erythema. Muscle size, tone and strength are normal.  No involuntary movements are noted. Skin:  Warm and dry. Good color, turgor and pigmentation. No lesions or scars. No cyanosis or clubbing  Neurological/Psychiatric: The patient's general behavior, level of consciousness, thought content and emotional status is normal.          INVESTIGATIONS     Laboratory Testing:     Recent Results (from the past 24 hour(s))   Culture, Blood 1    Collection Time: 09/10/22 12:35 PM    Specimen: Blood   Result Value Ref Range    Specimen Description . BLOOD     Special Requests 20ML LT AC     Culture NO GROWTH <24 HRS    CBC with Auto Differential    Collection Time: 09/10/22 12:45 PM   Result Value Ref Range    WBC 15.7 (H) 3.5 - 11.3 k/uL    RBC 4.06 3.95 - 5.11 m/uL    Hemoglobin 12.7 11.9 - 15.1 g/dL    Hematocrit 36.8 36.3 - 47.1 %    MCV 90.6 82.6 - 102.9 fL    MCH 31.3 25.2 - 33.5 pg    MCHC 34.5 28.4 - 34.8 g/dL    RDW 11.8 11.8 - 14.4 %    Platelets 317 921 - 337 k/uL    MPV 8.8 8.1 - 13.5 fL    NRBC Automated 0.0 0.0 per 100 WBC    Immature Granulocytes 1 (H) 0 %    Seg Neutrophils 89 (H) 36 - 65 %    Lymphocytes 3 (L) 24 - 43 %    Monocytes 6 3 - 12 %    Eosinophils % 1 1 - 4 %    Basophils 0 0 - 2 %    Absolute Immature Granulocyte 0.16 0.00 - 0.30 k/uL    Segs Absolute 13.97 (H) 1.50 - 8.10 k/uL    Absolute Lymph # 0.47 (L) 1.10 - 3.70 k/uL    Absolute Mono # 0.94 0.10 - 1.20 k/uL Absolute Eos # 0.16 0.00 - 0.44 k/uL    Basophils Absolute 0.00 0.00 - 0.20 k/uL    Morphology Normal    Comprehensive Metabolic Panel    Collection Time: 09/10/22 12:45 PM   Result Value Ref Range    Glucose 104 (H) 70 - 99 mg/dL    BUN 15 8 - 23 mg/dL    Creatinine 0.72 0.50 - 0.90 mg/dL    Calcium 8.4 (L) 8.6 - 10.4 mg/dL    Sodium 131 (L) 135 - 144 mmol/L    Potassium 4.4 3.7 - 5.3 mmol/L    Chloride 91 (L) 98 - 107 mmol/L    CO2 30 20 - 31 mmol/L    Anion Gap 10 9 - 17 mmol/L    Alkaline Phosphatase 71 35 - 104 U/L    ALT 15 5 - 33 U/L    AST 31 <32 U/L    Total Bilirubin 0.5 0.3 - 1.2 mg/dL    Total Protein 7.0 6.4 - 8.3 g/dL    Albumin 3.4 (L) 3.5 - 5.2 g/dL    Albumin/Globulin Ratio 0.9 (L) 1.0 - 2.5    GFR Non-African American >60 >60 mL/min    GFR African American >60 >60 mL/min    GFR Comment         Lactate, Sepsis    Collection Time: 09/10/22 12:45 PM   Result Value Ref Range    Lactic Acid, Sepsis, Whole Blood 1.3 0.5 - 1.9 mmol/L   D-Dimer, Quantitative    Collection Time: 09/10/22 12:45 PM   Result Value Ref Range    D-Dimer, Quant 4.99 mg/L FEU   Urinalysis with Microscopic    Collection Time: 09/10/22 12:54 PM   Result Value Ref Range    Color, UA Yellow Yellow    Turbidity UA Cloudy (A) Clear    Glucose, Ur NEGATIVE NEGATIVE    Bilirubin Urine NEGATIVE NEGATIVE    Ketones, Urine NEGATIVE NEGATIVE    Specific Gravity, UA 1.016 1.005 - 1.030    Urine Hgb SMALL (A) NEGATIVE    pH, UA 6.5 5.0 - 8.0    Protein, UA 1+ (A) NEGATIVE    Urobilinogen, Urine Normal Normal    Nitrite, Urine POSITIVE (A) NEGATIVE    Leukocyte Esterase, Urine SMALL (A) NEGATIVE    WBC, UA 50  0 - 5 /HPF    RBC, UA 5 TO 10 0 - 4 /HPF    Casts UA  0 - 8 /LPF     5 TO 10 HYALINE Reference range defined for non-centrifuged specimen.     Epithelial Cells UA None 0 - 5 /HPF    Bacteria, UA MODERATE (A) None   COVID-19, Rapid    Collection Time: 09/10/22  2:17 PM    Specimen: Nasopharyngeal Swab   Result Value Ref Range Specimen Description . NASOPHARYNGEAL SWAB     SARS-CoV-2, Rapid Not Detected Not Detected   Lactate, Sepsis    Collection Time: 09/10/22  3:34 PM   Result Value Ref Range    Lactic Acid, Sepsis, Whole Blood 0.7 0.5 - 1.9 mmol/L       Imaging:   XR CHEST PORTABLE    Result Date: 9/10/2022  Diffuse pulmonary infiltrates consistent with pneumonia. CT CHEST PULMONARY EMBOLISM W CONTRAST    Result Date: 9/10/2022  Pulmonary fibrosis with suggestion of an underlying infectious/inflammatory process Coronary artery/atherosclerotic disease No evidence of pulmonary embolism Mediastinal and hilar lymphadenopathy, probably reactive. ASSESSMENT & PLAN     ASSESSMENT / PLAN:       Principal Problem:   Sepsis ([facility] [address]  -Patient is hemodynamically stable  -Blood cultures pending  -Urine culture pending  -Receiving ceftriaxone and azithromycin  -Lactic acid normal  -WBC elevated at 15.7    COPD exacerbation  -Patient is using 6 L nasal cannula  -Maintaining sats between 88 to 92%  -Antibiotics ceftriaxone azithromycin added  -Receiving breathing treatment, DuoNeb's and Solu-Medrol    ILD (interstitial lung disease) ([facility] [address]  Plan: Patient takes prednisone 10 mg at home. Currently on Solu-Medrol 40 Mg. Acute cystitis without hematuria  Plan: Urine culture pending  -Ceftriaxone would be sufficient. Patient is asymptomatic    DVT ppx: Lovenox  GI ppx: Not needed    PT/OT/SW  Discharge Planning:    Malika Lundy MD  Internal Medicine Resident, PGY-1  [address], [place]  9/10/2022, 8:00 PM  Attending Physician Statement  I have discussed the care of [facility], including pertinent history and exam findings,  with the resident. I have seen and examined the patient and the key elements of all parts of the encounter have been performed by me. I agree with the assessment, plan and orders as documented by the resident with additions .   Seen in ER Management discussed    Treatment plan Discussed with nursing staff in detail , all questions answered . Electronically signed by Dafne Martínez MD on   9/10/22 at 9:29 PM EDT    Please note that this chart was generated using voice recognition Dragon dictation software. Although every effort was made to ensure the accuracy of this automated transcription, some errors in transcription may have occurred.

## 2022-09-11 NOTE — PROGRESS NOTES
[facility]  Internal Medicine Teaching Residency Program  Inpatient Daily Progress Note  ______________________________________________________________________________    Patient: Savana Stokes  YOB: 1935   PLU:[id number]    Acct: [de-identified]     Room: 80/0-65  Admit date: 9/10/2022  Today's date: 09/11/22  Number of days in the hospital: 1    SUBJECTIVE   Admitting Diagnosis: Sepsis ([address]  CC: SOB    Pt examined at bedside. Chart & results reviewed. No acute overnight events. Pt is vitally stable, /55, afebrile    Overnight, patient wanted to have a discussion regarding CODE STATUS. CODE STATUS was changed to DNR CCA in the presence of RN. On exam, patient has bilateral crackles/rhonchi. Black swelling resolved. Irregular pulse. Continue same treatment. Discontinue Solu-Medrol and start prednisone 40 Mg daily  WBC increased from 15.7-19.4 secondary to Solu-Medrol. ROS:  Constitutional:  negative for chills, fevers, sweats  Respiratory:  negative for cough, dyspnea on exertion, hemoptysis, shortness of breath, wheezing  Cardiovascular:  negative for chest pain, chest pressure/discomfort, lower extremity edema, palpitations  Gastrointestinal:  negative for abdominal pain, constipation, diarrhea, nausea, vomiting  Neurological:  negative for dizziness, headache  BRIEF HISTORY     The patient is a pleasant 81 y.o. female with past medical history of interstitial lung disease, COPD, asthma, hyperlipidemia came to the ER via [facility] due to complaints of shortness of breath. For the last few days patient was feeling short of breath. Patient was recently seen at [address] [place] [address] 3 days ago for similar symptoms. Daughter stated that patient has been having increased hypoxic episode for the past 2 weeks and becomes confused. Patient and daughter denies any fever, productive cough, abdominal pain, headache, nausea, vomiting, diarrhea, weakness. Regular without murmur, clicks, gallops or rubs. Abdomen: Slightly rounded and soft without organomegaly. No rebound, rigidity or guarding was appreciated. Lymphatic: No lymphadenopathy. Musculoskeletal: Normal curvature of the spine. No gross muscle weakness. Extremities:  No lower extremity edema, ulcerations, tenderness, varicosities or erythema. Muscle size, tone and strength are normal.  No involuntary movements are noted. Skin:  Warm and dry. Good color, turgor and pigmentation. No lesions or scars. No cyanosis or clubbing  Neurological/Psychiatric: The patient's general behavior, level of consciousness, thought content and emotional status is normal.        Medications:  Scheduled Medications:    sodium chloride flush  5-40 mL IntraVENous 2 times per day    enoxaparin  30 mg SubCUTAneous Daily    cefTRIAXone (ROCEPHIN) IV  2,000 mg IntraVENous Q24H    azithromycin  500 mg IntraVENous Q24H    methylPREDNISolone  40 mg IntraVENous Daily    ipratropium-albuterol  1 ampule Inhalation Q4H WA     Continuous Infusions:    sodium chloride       PRN Medicationssodium chloride flush, 5-40 mL, PRN  sodium chloride, , PRN  ondansetron, 4 mg, Q8H PRN   Or  ondansetron, 4 mg, Q6H PRN  polyethylene glycol, 17 g, Daily PRN  acetaminophen, 650 mg, Q6H PRN   Or  acetaminophen, 650 mg, Q6H PRN        Diagnostic Labs:  CBC:   Recent Labs     09/10/22  1245   WBC 15.7*   RBC 4.06   HGB 12.7   HCT 36.8   MCV 90.6   RDW 11.8        BMP:   Recent Labs     09/10/22  1245   *   K 4.4   CL 91*   CO2 30   BUN 15   CREATININE 0.72     BNP: No results for input(s): BNP in the last 72 hours. PT/INR: No results for input(s): PROTIME, INR in the last 72 hours. APTT: No results for input(s): APTT in the last 72 hours. CARDIAC ENZYMES: No results for input(s): CKMB, CKMBINDEX, TROPONINI in the last 72 hours.     Invalid input(s): CKTOTAL;3  FASTING LIPID PANEL:No results found for: CHOL, HDL, TRIG  LIVER PROFILE: Recent Labs     09/10/22  1245   AST 31   ALT 15   BILITOT 0.5   ALKPHOS 71      MICROBIOLOGY:   Lab Results   Component Value Date/Time    CULTURE NO GROWTH <24 HRS 09/10/2022 08:22 PM       Imaging:    XR CHEST PORTABLE    Result Date: 9/10/2022  Diffuse pulmonary infiltrates consistent with pneumonia. CT CHEST PULMONARY EMBOLISM W CONTRAST    Result Date: 9/10/2022  Pulmonary fibrosis with suggestion of an underlying infectious/inflammatory process Coronary artery/atherosclerotic disease No evidence of pulmonary embolism Mediastinal and hilar lymphadenopathy, probably reactive. ASSESSMENT & PLAN     ASSESSMENT / PLAN:     Principal Problem:   Sepsis ([facility] [address]  -Patient is hemodynamically stable  -Blood cultures no growth thus  -Urine culture-E. coli  -Receiving ceftriaxone and azithromycin  -Lactic acid normal  -WBC elevated at 15.7 > 19.4-secondary to Solu-Medrol     COPD exacerbation  -Use low-flow supplemental oxygen  -Maintaining sats between 88 to 92%  -Antibiotics ceftriaxone azithromycin added  -Receiving breathing treatment, DuoNeb's  -DC Solu-Medrol, initiate prednisone 40 Mg     ILD (interstitial lung disease) ([facility] [address]  Plan: Patient takes prednisone 10 mg at home. Currently on Solu-Medrol 40 Mg. Acute cystitis without hematuria  Plan: Positive for E. coli  -On ceftriaxone  Patient is asymptomatic     DVT ppx: Lovenox  GI ppx: Not needed     PT/OT/SW  Discharge Planning:    Kelechi Cowart MD  Internal Medicine Resident, PGY-1  [address], [place]  9/11/2022, 2:56 AM

## 2022-09-11 NOTE — PROGRESS NOTES
Stairs - Number of Steps: 3  Entrance Stairs - Rails: Right  Bathroom Shower/Tub: Walk-in shower, Shower chair with back  Bathroom Toilet: Standard  Bathroom Equipment: Grab bars in shower, Shower chair  Bathroom Accessibility: Not accessible  Home Equipment: Rollator, Oxygen (pt reports ambulating without AD in the home, uses rollator for community distances; 5L O2)  Receives Help From: Family  ADL Assistance: Independent  Homemaking Responsibilities: Yes  Ambulation Assistance: Independent  Transfer Assistance: Independent  Active : Yes  Mode of Transportation: Car  Occupation: Retired  Type of Occupation: teacher  Leisure & Hobbies: Enjoys visiting people  Additional Comments: Pt reports difficulty with ADLs and IADLs and recently dtr and neighbors have been providing assistance. Objective   SpO2: 98 % (% throughout entire session)  O2 Device: Non-rebreather mask             Safety Devices  Type of Devices: Call light within reach; Heels elevated for pressure relief;Patient at risk for falls; Left in bed;Nurse notified  Restraints  Restraints Initially in Place: No  Bed Mobility Training  Bed Mobility Training: Yes  Supine to Sit: Stand-by assistance  Sit to Supine: Stand-by assistance  Scooting: Stand-by assistance  Balance  Sitting: Intact (Pt sat unsupported EOB at SBA ~3 mins. Pt reporting increased SOB.)  Standing: Impaired (Pt stood bedside at min A ~30 seconds, requesting to lay down in bed d/t SOB. Pt did not demo func mob this date d/t increased SOB throughout session.)  Transfer Training  Transfer Training: Yes  Sit to Stand: Contact-guard assistance  Stand to Sit: Contact-guard assistance     AROM: Within functional limits (B hands, elbows, and shoulders AROM WFL)  PROM: Within functional limits  Strength:  Within functional limits (B hands 4/5, B elbows 4-/5, B shoulders 4/5.)  Coordination: Generally decreased, functional (Pt reported chronic tremors in B hands.)  Tone: Normal  Sensation: Intact  ADL  Feeding: Supervision  Grooming: Stand by assistance  UE Bathing: Stand by assistance  LE Bathing: Minimal assistance  UE Dressing: Stand by assistance  LE Dressing: Minimal assistance  Toileting: Minimal assistance  Additional Comments: Pt began session supine in bed. Pt sat unsupported EOB at SBA. Pt reporting increased SOB. Pt stood at bedside at min A, reporting increased SOB, requesting to lay down in bed. Pt self limiting at times, repeating DNR status throughout session. Pt SPO2 % during entire session. Pt ended session supine in bed d/t SOB. Vision  Vision: Impaired  Vision Exceptions: Wears glasses for reading  Hearing  Hearing: Within functional limits      Cognition  Overall Cognitive Status: Exceptions  Arousal/Alertness: Delayed responses to stimuli  Following Commands: Follows multistep commands with repitition  Attention Span: Attends with cues to redirect  Safety Judgement: Decreased awareness of need for assistance;Decreased awareness of need for safety  Problem Solving: Decreased awareness of errors  Insights: Decreased awareness of deficits  Initiation: Requires cues for some  Cognition Comment: Pt self limiting at times throughout session, repeated DNR status  Orientation  Overall Orientation Status: Within Functional Limits                  Education Given To: Patient  Education Provided: Role of Therapy;Plan of Care;ADL Adaptive Strategies;Transfer Training; Fall Prevention Strategies  Education Provided Comments: Pt educated on pursed lip breathing technique  Education Method: Demonstration;Verbal  Barriers to Learning: None  Education Outcome: Verbalized understanding;Demonstrated understanding  LUE AROM   LUE AROM : WFL (L elbow and shoulder AROM WFL)  Left Hand AROM   Left Hand AROM: WFL  RUE AROM   RUE AROM : WFL (R elbow and shoulder AROM WFL)  Right Hand AROM   Right Hand AROM: WFL                  AM-PAC Score        AM-PAC Inpatient Daily Activity Raw Score: 18 (09/11/22 0940)  AM-PAC Inpatient ADL T-Scale Score : 38.66 (09/11/22 0940)  ADL Inpatient CMS 0-100% Score: 46.65 (09/11/22 0940)  ADL Inpatient CMS G-Code Modifier : CK (09/11/22 0940)       Goals  Short Term Goals  Time Frame for Short term goals: Pt will by discharge  Short Term Goal 1: demo UB bathing/dressing independently. Short Term Goal 2: demo LB bathing/dressing at SBA, AE PRN. Short Term Goal 3: demo dynamic standing during func activities for 5+ mins at SBA, LRD PRN, no rest breaks. Short Term Goal 4: demo functional transfers at SUP, LRD PRN, including toilet transfers. Short Term Goal 5: demo pursed lip breathing technique throughout session with 1 vc.        Therapy Time   Individual Concurrent Group Co-treatment   Time In 9082         Time Out 0804         Minutes 21         Timed Code Treatment Minutes: 8 Minutes co-arsh w/ PT       Miguel Blankenship S/OT

## 2022-09-11 NOTE — PLAN OF CARE
Problem: Discharge Planning  Goal: Discharge to home or other facility with appropriate resources  Outcome: Progressing  Flowsheets (Taken 9/11/2022 1920)  Discharge to home or other facility with appropriate resources:   Identify barriers to discharge with patient and caregiver   Arrange for needed discharge resources and transportation as appropriate   Identify discharge learning needs (meds, wound care, etc)     Problem: Safety - Adult  Goal: Free from fall injury  Outcome: Progressing  Flowsheets (Taken 9/11/2022 1920)  Free From Fall Injury: Instruct family/caregiver on patient safety     Problem: Skin/Tissue Integrity  Goal: Absence of new skin breakdown  Description: 1. Monitor for areas of redness and/or skin breakdown  2. Assess vascular access sites hourly  3. Every 4-6 hours minimum:  Change oxygen saturation probe site  4. Every 4-6 hours:  If on nasal continuous positive airway pressure, respiratory therapy assess nares and determine need for appliance change or resting period.   Outcome: Progressing     Problem: Respiratory - Adult  Goal: Achieves optimal ventilation and oxygenation  9/11/2022 1920 by Jenelle Way RN  Outcome: Progressing  Flowsheets (Taken 9/11/2022 1920)  Achieves optimal ventilation and oxygenation:   Assess for changes in respiratory status   Assess for changes in mentation and behavior   Position to facilitate oxygenation and minimize respiratory effort   Oxygen supplementation based on oxygen saturation or arterial blood gases   Assess and instruct to report shortness of breath or any respiratory difficulty   Respiratory therapy support as indicated  9/11/2022 0915 by Gamaliel Guzmán RCP  Outcome: Progressing     Problem: ABCDS Injury Assessment  Goal: Absence of physical injury  Outcome: Progressing  Flowsheets (Taken 9/11/2022 1920)  Absence of Physical Injury: Implement safety measures based on patient assessment

## 2022-09-11 NOTE — PLAN OF CARE
Problem: Respiratory - Adult  Goal: Achieves optimal ventilation and oxygenation  Outcome: Progressing   PROVIDE ADEQUATE OXYGENATION WITH ACCEPTABLE SP02/ABG'S    [x]  IDENTIFY APPROPRIATE OXYGEN THERAPY  [x]   MONITOR SP02/ABG'S AS NEEDED   [x]   PATIENT EDUCATION AS NEEDED   BRONCHOSPASM/BRONCHOCONSTRICTION     [x]         IMPROVE AERATION/BREATH SOUNDS  [x]   ADMINISTER BRONCHODILATOR THERAPY AS APPROPRIATE  [x]   ASSESS BREATH SOUNDS  []   IMPLEMENT AEROSOL/MDI PROTOCOL  [x]   PATIENT EDUCATION AS NEEDED

## 2022-09-11 NOTE — PLAN OF CARE
Problem: Discharge Planning  Goal: Discharge to home or other facility with appropriate resources  Outcome: Not Progressing     Problem: Safety - Adult  Goal: Free from fall injury  Outcome: Progressing     Problem: Skin/Tissue Integrity  Goal: Absence of new skin breakdown  Description: 1. Monitor for areas of redness and/or skin breakdown  2. Assess vascular access sites hourly  3. Every 4-6 hours minimum:  Change oxygen saturation probe site  4. Every 4-6 hours:  If on nasal continuous positive airway pressure, respiratory therapy assess nares and determine need for appliance change or resting period.   Outcome: Progressing     Problem: Discharge Planning  Goal: Discharge to home or other facility with appropriate resources  Outcome: Not Progressing

## 2022-09-12 NOTE — PLAN OF CARE
Paged by RN regarding coughing spells with phlegm production and desaturation. Bedside assessment shows alert and oriented female who is in respiratory distress. RR 30-33, spo2 85-90% on supplemental oxygen via NC 10L. Chest exam shows b/l equal air entry with fine diffuse crackles. Chart reviewed. Will get a CXR. RT eval and breathing treatment. Start her on bipap as needed for shortness of breath. Continue on rest of treatment as advised.       Enma Jeffrey MD.  Internal Medicine Resident PGY 2  [facility]   9/12/2022, 4:08 AM

## 2022-09-12 NOTE — PLAN OF CARE
Problem: Respiratory - Adult  Goal: Achieves optimal ventilation and oxygenation  9/12/2022 0624 by Armand Palafox RN  Outcome: Not Progressing  9/11/2022 1920 by Vincenzo Glass RN  Outcome: Progressing  Flowsheets (Taken 9/11/2022 1920)  Achieves optimal ventilation and oxygenation:   Assess for changes in respiratory status   Assess for changes in mentation and behavior   Position to facilitate oxygenation and minimize respiratory effort   Oxygen supplementation based on oxygen saturation or arterial blood gases   Assess and instruct to report shortness of breath or any respiratory difficulty   Respiratory therapy support as indicated     Problem: Discharge Planning  Goal: Discharge to home or other facility with appropriate resources  9/12/2022 0624 by Armand Palafox RN  Outcome: Progressing  9/11/2022 1920 by Vincenzo Glass RN  Outcome: Progressing  Flowsheets (Taken 9/11/2022 1920)  Discharge to home or other facility with appropriate resources:   Identify barriers to discharge with patient and caregiver   Arrange for needed discharge resources and transportation as appropriate   Identify discharge learning needs (meds, wound care, etc)     Problem: Safety - Adult  Goal: Free from fall injury  9/12/2022 0624 by Armand Palafox RN  Outcome: Progressing  9/11/2022 1920 by Vincenzo Glass RN  Outcome: Progressing  Flowsheets (Taken 9/11/2022 1920)  Free From Fall Injury: Instruct family/caregiver on patient safety     Problem: Skin/Tissue Integrity  Goal: Absence of new skin breakdown  Description: 1. Monitor for areas of redness and/or skin breakdown  2. Assess vascular access sites hourly  3. Every 4-6 hours minimum:  Change oxygen saturation probe site  4. Every 4-6 hours:  If on nasal continuous positive airway pressure, respiratory therapy assess nares and determine need for appliance change or resting period.   9/12/2022 0624 by Armand Palafox RN  Outcome: Progressing  9/11/2022 1920 by Vincenzo Glass RN  Outcome: Progressing     Problem: ABCDS Injury Assessment  Goal: Absence of physical injury  9/12/2022 0624 by Olga Lisa RN  Outcome: Progressing  9/11/2022 1920 by Radha Agudelo RN  Outcome: Progressing  Flowsheets (Taken 9/11/2022 1920)  Absence of Physical Injury: Implement safety measures based on patient assessment     Problem: Pain  Goal: Verbalizes/displays adequate comfort level or baseline comfort level  Outcome: Progressing     Problem: Respiratory - Adult  Goal: Achieves optimal ventilation and oxygenation  9/12/2022 0624 by Olga Lisa RN  Outcome: Not Progressing  9/11/2022 1920 by Radha Agudelo RN  Outcome: Progressing  Flowsheets (Taken 9/11/2022 1920)  Achieves optimal ventilation and oxygenation:   Assess for changes in respiratory status   Assess for changes in mentation and behavior   Position to facilitate oxygenation and minimize respiratory effort   Oxygen supplementation based on oxygen saturation or arterial blood gases   Assess and instruct to report shortness of breath or any respiratory difficulty   Respiratory therapy support as indicated

## 2022-09-12 NOTE — PROGRESS NOTES
there is a slight reduction in oral appetite. As per patient's chart, patient is having difficulty urination these days. Patient states that she uses compressor for home oxygen since 2020. Patient has to switch on the compressor to deliver oxygen and she also sometimes use nasal cannula for several seconds a few times a day. She is not on continuous oxygen at home. Patient states that her hypoxic episode started 2020. She believed that she had COVID but every test was negative. She went to multiple doctors and eventually was diagnosed with interstitial lung disease. She is taking prednisone 10 mg since 2022. In the ER, patient was hypoxic and was placed on 10 L nonrebreather mask and slowly wean down. Urine analysis was positive for UTI. Preliminary results from blood cultures were negative. Chest x-ray showed diffuse pulmonary infiltrates concerning for pneumonia. Lactic acid was normal.  WBC elevated at 15.7. OBJECTIVE     Vital Signs:  /65   Pulse 84   Temp 97.3 °F (36.3 °C) (Axillary)   Resp 26   Ht 5' 1\" (1.549 m)   Wt 110 lb (49.9 kg)   SpO2 96%   BMI 20.78 kg/m²     Temp (24hrs), Av.9 °F (36.6 °C), Min:97.3 °F (36.3 °C), Max:99 °F (37.2 °C)    No intake/output data recorded. Physical Exam:  Constitutional: This is a well developed, well nourished, 18.5-24.9 - Normal 81y.o. year old female who is alert, oriented, cooperative and in no apparent distress. Head:normocephalic and atraumatic. EENT:  PERRLA. No conjunctival injections. Septum was midline, mucosa was without erythema, exudates or cobblestoning. No thrush was noted. Neck: Supple without thyromegaly. No elevated JVP. Trachea was midline. Respiratory:  Breath sounds bilaterally diminished. Crackles and  present bilaterally. There is no intercostal retraction or use of accessory muscles. Cardiovascular: Regular without murmur, clicks, gallops or rubs.    Abdomen: Slightly rounded and soft without organomegaly. No rebound, rigidity or guarding was appreciated. Lymphatic: No lymphadenopathy. Musculoskeletal: Normal curvature of the spine. No gross muscle weakness. Extremities:  No lower extremity edema, ulcerations, tenderness, varicosities or erythema. Muscle size, tone and strength are normal.  No involuntary movements are noted. Skin:  Warm and dry. Good color, turgor and pigmentation. No lesions or scars.   No cyanosis or clubbing  Neurological/Psychiatric: The patient's general behavior, level of consciousness, thought content and emotional status is normal.        Medications:  Scheduled Medications:    predniSONE  40 mg Oral Daily    sodium chloride flush  5-40 mL IntraVENous 2 times per day    enoxaparin  30 mg SubCUTAneous Daily    cefTRIAXone (ROCEPHIN) IV  2,000 mg IntraVENous Q24H    azithromycin  500 mg IntraVENous Q24H    ipratropium-albuterol  1 ampule Inhalation Q4H WA     Continuous Infusions:    dextrose      sodium chloride       PRN MedicationsALPRAZolam, 0.25 mg, Nightly PRN  glucose, 4 tablet, PRN  dextrose bolus, 125 mL, PRN   Or  dextrose bolus, 250 mL, PRN  glucagon (rDNA), 1 mg, PRN  dextrose, , Continuous PRN  dextromethorphan-guaiFENesin, 10 mL, Q6H PRN  zolpidem, 5 mg, Nightly PRN  sodium chloride flush, 5-40 mL, PRN  sodium chloride, , PRN  ondansetron, 4 mg, Q8H PRN   Or  ondansetron, 4 mg, Q6H PRN  polyethylene glycol, 17 g, Daily PRN  acetaminophen, 650 mg, Q6H PRN   Or  acetaminophen, 650 mg, Q6H PRN        Diagnostic Labs:  CBC:   Recent Labs     09/10/22  1245 09/11/22 0527 09/12/22 0436   WBC 15.7* 19.4* 17.0*   RBC 4.06 3.82* 3.78*   HGB 12.7 11.9 11.6*   HCT 36.8 35.5* 34.8*   MCV 90.6 92.9 92.1   RDW 11.8 11.8 11.5*    See Reflexed IPF Result 402     BMP:   Recent Labs     09/10/22  1245 09/11/22 0527 09/12/22 0436   * 132* 129*   K 4.4 4.3 4.6   CL 91* 94* 93*   CO2 30 26 24   BUN 15 11 15   CREATININE 0.72 0.60 0.59 BNP: No results for input(s): BNP in the last 72 hours. PT/INR: No results for input(s): PROTIME, INR in the last 72 hours. APTT: No results for input(s): APTT in the last 72 hours. CARDIAC ENZYMES: No results for input(s): CKMB, CKMBINDEX, TROPONINI in the last 72 hours. Invalid input(s): CKTOTAL;3  FASTING LIPID PANEL:No results found for: CHOL, HDL, TRIG  LIVER PROFILE:   Recent Labs     09/10/22  1245   AST 31   ALT 15   BILITOT 0.5   ALKPHOS 71      MICROBIOLOGY:   Lab Results   Component Value Date/Time    CULTURE NO GROWTH 1 DAY 09/10/2022 08:22 PM       Imaging:    XR CHEST PORTABLE    Result Date: 9/10/2022  Diffuse pulmonary infiltrates consistent with pneumonia. CT CHEST PULMONARY EMBOLISM W CONTRAST    Result Date: 9/10/2022  Pulmonary fibrosis with suggestion of an underlying infectious/inflammatory process Coronary artery/atherosclerotic disease No evidence of pulmonary embolism Mediastinal and hilar lymphadenopathy, probably reactive. ASSESSMENT & PLAN     ASSESSMENT / PLAN:     Principal Problem:   Sepsis [facility]):   -Consult hospice or palliative care for discussion with patient   -Patient is hemodynamically stable   -Blood cultures without growth   -Urine culture positive for E. Coli   -Continue ceftriaxone and azithromycin   -WBC decreased from 19.4-17.0  COPD Exacerbation:   -Currently on high-flow nasal cannula 10L   -Wean O2 L to maintain O2 saturations >88- 92%   -Continue DuoNeb treatments   -Continue prednisone 40 mg  Interstitial Lung Disease:   -Patient takes prednisone 10 mg at home   -Continue prednisone 40 mg  Acute Cystitis w/o hematuria:   -Urine culture positive for E. Coli   -Continue ceftriaxone    DVT ppx : Lovenox  GI ppx: None    PT/OT: Consulted  Discharge Planning / SW: Kriss Harada, [facility] [place] [address] [facility];  [place], [place]  9/12/2022, 8:28 AM  Attending Physician Statement  I have discussed the care of [facility], including

## 2022-09-12 NOTE — DISCHARGE INSTR - COC
Continuity of Care Form    Patient Name: Lauren Gupta   :  1935  MRN:  [id number]    Admit date:  9/10/2022  Discharge date:  ***    Code Status Order: DNR-CCA   Advance Directives:     Admitting Physician:  Crissy Ortiz MD  PCP: No primary care provider on file. Discharging Nurse: [facility] Unit/Room#: 0505/0505-01  Discharging Unit Phone Number: ***    Emergency Contact:   Extended Emergency Contact Information  Primary Emergency Contact: [facility] [place] Phone: [phone]  Relation: None    Past Surgical History:  No past surgical history on file. Immunization History: There is no immunization history on file for this patient. Active Problems:  Patient Active Problem List   Diagnosis Code    Sepsis ([facility] A41.9    ILD (interstitial lung disease) ([facility] J84.9    Acute cystitis without hematuria N30.00    [facility] J84.112       Isolation/Infection:   Isolation            No Isolation          Patient Infection Status       None to display            Nurse Assessment:  Last Vital Signs: /65   Pulse 84   Temp 97.3 °F (36.3 °C) (Axillary)   Resp 26   Ht 5' 1\" (1.549 m)   Wt 110 lb (49.9 kg)   SpO2 96%   BMI 20.78 kg/m²     Last documented pain score (0-10 scale): Pain Level: 2  Last Weight:   Wt Readings from Last 1 Encounters:   09/10/22 110 lb (49.9 kg)     Mental Status:  {IP PT MENTAL STATUS:04591}    IV Access:  { HERVE IV ACCESS:816166329}    Nursing Mobility/ADLs:  Walking   {CHP DME AFBF:704132935}  Transfer  {CHP DME QPKX:970553679}  Bathing  {CHP DME KTPJ:845000809}  Dressing  {CHP DME LXQV:691751535}  Toileting  {CHP DME QFG}  Feeding  {CHP DME VNPJ:727411772}  Med Admin  {P DME MNT}  Med Delivery   { HERVE MED Delivery:598290858}    Wound Care Documentation and Therapy:        Elimination:  Continence:    Bowel: {YES / XT:08018}  Bladder: {YES / QO:30328}  Urinary Catheter: {Urinary Catheter:737231318}   Colostomy/Ileostomy/Ileal Conduit: {YES / T}       Date of Last BM: ***  No intake or output data in the 24 hours ending 22 1119  No intake/output data recorded.     Safety Concerns:     [address] Safety Concerns:275272807}    Impairments/Disabilities:      [address] Impairments/Disabilities:483049289}    Nutrition Therapy:  Current Nutrition Therapy:   [address] HERVE Diet List:223443929}    Routes of Feeding: {CHP DME Other Feedings:970188275}  Liquids: {Slp liquid thickness:89568}  Daily Fluid Restriction: {CHP DME Yes amt example:032323559}  Last Modified Barium Swallow with Video (Video Swallowing Test): {Done Not Done LSWP:604207699}    Treatments at the Time of Hospital Discharge:   Respiratory Treatments: ***  Oxygen Therapy:  {Therapy; copd oxygen:87005}  Ventilator:    {MH CC Vent TXYQ:860215082}    Rehab Therapies: {THERAPEUTIC INTERVENTION:5509660249}  Weight Bearing Status/Restrictions: [address]  Weight Bearin}  Other Medical Equipment (for information only, NOT a DME order):  {EQUIPMENT:317154561}  Other Treatments: ***    Patient's personal belongings (please select all that are sent with patient):  {CHP DME Belongings:134232559}    RN SIGNATURE:  {Esignature:674842895}    CASE MANAGEMENT/SOCIAL WORK SECTION    Inpatient Status Date: ***    Readmission Risk Assessment Score:  Readmission Risk              Risk of Unplanned Readmission:  11           Discharging to Facility/ Agency   Name:   Address:  Phone:  Fax:    Dialysis Facility (if applicable)   Name:  Address:  Dialysis Schedule:  Phone:  Fax:    / signature: {Esignature:423628612}    PHYSICIAN SECTION    Prognosis: {Prognosis:1665838913}    Condition at Discharge: [address] Patient Condition:665956200}    Rehab Potential (if transferring to Rehab): {Prognosis:3757307447}    Recommended Labs or Other Treatments After Discharge: ***    Physician Certification: I certify the above information and

## 2022-09-12 NOTE — PROGRESS NOTES
Physician Progress Note      PATIENT:               David Pretty  CSN #:                  [id number]  :                       1935  ADMIT DATE:       9/10/2022 12:09 PM  [address] [facility] DATE:  RESPONDING  PROVIDER #:        Doris Zuleta          QUERY TEXT:    Pt admitted with sepsis. Pt noted to have hypoxia and increased O2   requirement. If possible, please document in the progress notes and discharge   summary if you are evaluating and/or treating any of the following: The medical record reflects the following:  Risk Factors: sepsis, possible pna, COPD exacerbation, ILD  Clinical Indicators: presents with shortness of breath, arrives on 10L O2 via   NRB mask, tachycardic and tachypneic,  per ED weaned to 8 L NC which she   tolerated well with overall SPO2 approximately 92%, per H&P  \"Chest x-ray   showed diffuse pulmonary infiltrates concerning for pneumonia, She is not on   continuous oxygen at home\", per nursing  am \"oxygen dropped to 56% put on   NRB 15L oxygen back up to 85-91%\"  Treatment: O2 via NRB mask at 10L, IV Solumedrol, Duoneb, ceftriaxone,   azithromycin, CXR, continuous pulse ox, RT protocol    Thank you, Mitali Thompson, ESHA  email - [email or web address]  cell- [phone]  office hours --7A-3P  Options provided:  -- Acute respiratory failure with hypoxia  -- Acute respiratory failure with hypoxia and hypercapnia  -- Other - I will add my own diagnosis  -- Disagree - Not applicable / Not valid  -- Disagree - Clinically unable to determine / Unknown  -- Refer to Clinical Documentation Reviewer    PROVIDER RESPONSE TEXT:    This patient is in acute respiratory failure with hypoxia. Query created by: Denisa Wang on 2022 6:57 AM      Electronically signed by:  Doris Zuleta 2022 7:32 AM  Attending Physician Statement  I have discussed the care of [facility], including pertinent history and exam findings,  with the resident.  I have seen and examined the patient and the key elements of all parts of the encounter have been performed by me. I agree with the assessment, plan and orders as documented by the resident with additions . 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 He has advanced degree of respiratory failure secondary to the interstitial lung disease. He is being treated with antibiotics steroids. He is very cachectic. Her overall prognosis seems to be rather poor. We will have palliative care see her. We will continue the present treatment with supplemental oxygen. Continue the antibiotics and steroids. Continue bronchodilators. Reviewed the chest x-ray and CT scan. Discussed with . Treatment plan Discussed with nursing staff in detail , all questions answered . Electronically signed by Ernesto Fisher MD on   9/12/22 at 12:32 PM EDT  CC time 30 minutes  Please note that this chart was generated using voice recognition Dragon dictation software. Although every effort was made to ensure the accuracy of this automated transcription, some errors in transcription may have occurred.

## 2022-09-12 NOTE — PROGRESS NOTES
Comprehensive Nutrition Assessment    Type and Reason for Visit:  Initial, Positive Nutrition Screen (Wt loss, poor PO intake)    Nutrition Recommendations/Plan:   Send Ensure Plus twice daily  PO intake as tolerated. Malnutrition Assessment:  Malnutrition Status:  Insufficient data (09/12/22 1214)    Context:  Acute Illness     Findings of the 6 clinical characteristics of malnutrition:  Energy Intake:  Mild decrease in energy intake (Comment) (per EHR)  Weight Loss:  No significant weight loss     Body Fat Loss:  Unable to assess     Muscle Mass Loss:  Unable to assess    Fluid Accumulation:  No significant fluid accumulation     Strength:  Not Performed    Nutrition Assessment:    Pt presents to hospital with worsening SOB. Noted PMH of interstitial lung disease, COPD, asthma, hyperlipidemia. Sleeping x 2 attempted visits without family at bedside. Spoke with RN who reports pt ate a [place] yogurt, fruit cup, and drank coffee this morning at breakfast. Per EHR, pt with a \"slight reduction\" in PO intake. Weight hx reviewed. No recent weight loss noted using CBW of 110 lbs. Nutrition Related Findings:    meds/labs reviewed Wound Type: None       Current Nutrition Intake & Therapies:    Average Meal Intake: % (fruit, yogurt, coffee w/ breakfast)  Average Supplements Intake: None Ordered  ADULT DIET; Regular    Anthropometric Measures:  Height: 5' 1\" (154.9 cm)  Ideal Body Weight (IBW): 105 lbs (48 kg)       Current Body Weight: 110 lb 0.2 oz (49.9 kg), 104.8 % IBW. Current BMI (kg/m2): 20.8  Usual Body Weight:  ( lbs per EHR)                       BMI Categories: Normal Weight (BMI 18.5-24. 9)    Estimated Daily Nutrient Needs:  Energy Requirements Based On: Kcal/kg  Weight Used for Energy Requirements: Current  Energy (kcal/day): 5379-4346 kcals/day  Weight Used for Protein Requirements: Current  Protein (g/day): 55 gm/day  Method Used for Fluid Requirements: Other (Comment)  Fluid (ml/day): per MD    Nutrition Diagnosis:   Predicted inadequate energy intake related to  (current illness) as evidenced by  (EHR report of decreased PO intake)    Nutrition Interventions:   Food and/or Nutrient Delivery: Continue Current Diet, Start Oral Nutrition Supplement  Nutrition Education/Counseling: No recommendation at this time  Coordination of Nutrition Care: Continue to monitor while inpatient       Goals:  Previous Goal Met:  (goal set)  Goals: PO intake 50% or greater, prior to discharge       Nutrition Monitoring and Evaluation:   Behavioral-Environmental Outcomes: None Identified  Food/Nutrient Intake Outcomes: Food and Nutrient Intake, Supplement Intake  Physical Signs/Symptoms Outcomes: Weight, Biochemical Data, Nutrition Focused Physical Findings    Discharge Planning:     Too soon to determine     Mata London MS, RD, LD  Contact: [phone]

## 2022-09-13 NOTE — PROGRESS NOTES
Occupational [employer]  Occupational Therapy Not Seen Note    DATE: 2022    NAME: Tera Escobar  MRN: [id number]   : 1935      Patient not seen this date for Occupational Therapy due to: Pt declined stating \"I have absolutely no energy right now\". Pt C/O CRAIG and RN was notified.     Next Scheduled Treatment: 22    Electronically signed by CRYSTAL Morales on 2022 at 1:18 PM

## 2022-09-13 NOTE — PLAN OF CARE
BRONCHOSPASM/BRONCHOCONSTRICTION     [x]         IMPROVE AERATION/BREATH SOUNDS  [x]   ADMINISTER BRONCHODILATOR THERAPY AS APPROPRIATE  [x]   ASSESS BREATH SOUNDS  []   IMPLEMENT AEROSOL/MDI PROTOCOL  [x]   PATIENT EDUCATION AS NEEDED     Problem: Respiratory - Adult  Goal: Achieves optimal ventilation and oxygenation  Outcome: Not Progressing

## 2022-09-13 NOTE — PROGRESS NOTES
Physical Therapy        Physical Therapy Cancel Note      DATE: 2022    NAME: Nils Dove  MRN: [id number]   : 1935      Patient not seen this date for Physical Therapy due to:    Patient Declined: Pt reporting \"I'm too tired and my head hurts\". Pt educated on the importance of OOB, continued to decline at this time.  Will check back        Electronically signed by Maggy Smith PTA on 2022 at 2:44 PM

## 2022-09-13 NOTE — PLAN OF CARE
Patient has been asleep most of night. Writer was alerted by patient bed alarm. Patient was found out of bed, confused, oxygen off. Patient stated she wanted to go to bathroom. Patient was reminded that she had purewick on. Oxygen placed back on patient at 10 L HFNC. Oxygen was 50 % on room air. Patient recovered slowly on HFNC. She is now 90%.  Will continue to monitor

## 2022-09-13 NOTE — ACP (ADVANCE CARE PLANNING)
Advance Care Planning     Advance Care Planning Activator (Inpatient)  Conversation Note      Date of ACP Conversation: 9/13/2022     Conversation Conducted with: Patient with Decision Making Capacity    ACP Activator: Andrés Flanagan RN        Health Care Decision Maker:     Current Designated Health Care Decision Maker: Self     [address] on chart. Primary Decision Maker: Ivette Serrano - son  [phone] [facility])  Secondary Decision Maker: Leonel Homans - son [phone] [facility]  Supplemental Decision Maker: Amalia Richardson - daughter [phone] ([facility])    Today we documented Decision Maker(s) consistent with ACP documents on file. Care Preferences    Ventilation: \"If you were in your present state of health and suddenly became very ill and were unable to breathe on your own, what would your preference be about the use of a ventilator (breathing machine) if it were available to you? \"      Would the patient desire the use of ventilator (breathing machine)?: no    \"If your health worsens and it becomes clear that your chance of recovery is unlikely, what would your preference be about the use of a ventilator (breathing machine) if it were available to you? \"     Would the patient desire the use of ventilator (breathing machine)?: No      Resuscitation  \"CPR works best to restart the heart when there is a sudden event, like a heart attack, in someone who is otherwise healthy. Unfortunately, CPR does not typically restart the heart for people who have serious health conditions or who are very sick. \"    \"In the event your heart stopped as a result of an underlying serious health condition, would you want attempts to be made to restart your heart (answer \"yes\" for attempt to resuscitate) or would you prefer a natural death (answer \"no\" for do not attempt to resuscitate)? \" no       [] Yes   [x] No   Educated Patient / Cheryle Bane regarding differences between Advance Directives and portable DNR

## 2022-09-13 NOTE — CONSULTS
Palliative Care Inpatient Consult    NAME:  Melissa Rocha  MEDICAL RECORD NUMBER:  [id number]  AGE: 81 y.o. GENDER: female  : 1935  TODAY'S DATE:  2022    Reasons for Consultation:    Provision of information regarding PC and/or hospice philosophies  Complex, time-intensive communication and interdisciplinary psychosocial support  Clarification of goals of care and/or assistance with difficult decision-making  Guidance in regards to resources and transition(s)    Code Status: [address]    Summary:  Palliative Care support meeting   Discussed patient's wishes regarding code status and hospice. Spoke with patient's daughter Parag Bowers and son Zuleyka Wagoner regarding my conversation with their mother and her wishes. Requested resident to come discuss code status with patient. Pt indicates she wants hospice care. Code status changed to [address] regarding request for care.  working with [employer] of patient and family's choice. Support offered to patient, daughter and son. Members of PC team contributing to this consultation are :  Merissa Mccormack RN    History of Present Illness     The patient is a 81 y.o. Non- / non  female who presents with Shortness of Breath (Pt via [facility] for eval of SOB. Pt has HX of COPD. Pt was seen earlier this week and sent home. Pt sent home or given O2 by other facility. Pt over few days became increasingly SOB. Pt arrived on NRB @ 10L. Pt tachycardic and tachypneic upon arrival. )    Referred to Palliative Care by   [x] Physician   [] Nursing  [] Family Request   [] Other:       She was admitted to the primary service for Sepsis [facility]) [A41.9]  Sepsis, due to unspecified organism, unspecified whether acute organ dysfunction present ([facility] [A41.9]. Her hospital course has been associated with Sepsis ([facility]  The patient has a complicated medical history and has been hospitalized since 9/10/2022 12:09 PM.    Active Hospital Problems    Diagnosis Date Noted    Sepsis ([facility] [A41.9] 09/10/2022     Priority: Medium    ILD (interstitial lung disease) ([facility] [J84.9] 09/10/2022     Priority: Medium    Acute cystitis without hematuria [N30.00] 09/10/2022     Priority: Medium    IPF (idiopathic pulmonary fibrosis) ([facility] [J84.112] 09/10/2022     Priority: Medium       Data        Code Status: DNR-CCA     ADVANCED CARE PLANNING:  Patient has capacity for medical decisions: yes  Health Care Power of : yes  Living Will: yes     Personal, Social, and Family History  Marital Status:   Living situation:alone  Sandhya/Spiritual History:   not discussed  Psychological Distress: moderate  Does patient understand diagnosis/treatment? yes  Does family/caregiver understand diagnosis/treatment?  yes    Assessment        Palliative Performance Scale:    ___100% Full ambulation; normal activity and work; no evidence of disease; able to do own self care; normal intake; fully conscious  ___90% Full ambulation; normal activity and work; some evidence of disease; able to do own self care; normal intake; fully conscious  ___80% Full ambulation; normal activity with effort; some evidence of disease; able to do own self care; normal or reduced intake; fully conscious  ___70% Ambulation reduced; unable to perform normal job/work; significant disease; able to do own self care; normal or reduced intake; fully conscious  ___60%  Ambulation reduced; cannot do hobbies/housework; significant disease; occasional assist; intake normal or reduced; fully conscious/some confusion  ___50%  Mainly sit/lie; can't do any work; extensive disease; considerable assist; intake normal or reduced; fully conscious/some confusion  __x_40%  Mainly in bed; extensive disease; mainly assist; intake normal or reduced; fully conscious/ some confusion   ___30%  Bed bound; extensive disease; total care; intake reduced; fully conscious/some confusion  ___20%  Bed bound; extensive disease; total care; intake minimal; drowsy/coma  ___10%  Bed bound; extensive disease; total care; mouth care only; drowsy/coma  ___0       Death       Risk Assessments:    Conner Risk Score: [unfilled]    Readmission Risk Score: 10        1 Year Mortality Risk Score: @1YEARMORTALITYRISK@     Plan        Palliative Interaction:Reviewed course of care. Referral received to discuss with family hospice and palliative care. Updates received from bedside [address]. I went to visit Alka Denis in her room. Introduced myself as a palliative care nurse, explained that we are here to support her and her family, make sure her wishes are known and honored. We talked about her illness. She relates she has been fighting the breathing difficulties since 2020. She relates she is tired of living like this. Alka Denis relates that she does not want to keep living. She expresses that she does not want to do anything that will prolong her suffering (living). Alka Denis relates she has 02 at home and lately has been using it [place] lot\". I asked her if she has ever thought about hospice care. She relates she has and is interested in pursuing hospice. She relates she thinks she will need to go to a nursing home facility. I asked her if I could call and let her daughter know about our discussion. She related that I can. We talked about code status as well. I explained the difference between [facility] and [facility]. Explained that [facility] is most closely associated with hospice care. Alka Denis states clearly that she does not want intervention that would prolong her life, clearly states she would not want to be on a ventilator or have cpr done. She states she wants to be kept comfortable and allowed to pass peacefully. I updated bedside nurse [facility] and  Laura Hall. I called and spoke with Alfred Hernandes daughter in town. Alka Denis is not surprised by the conversation I had with her mom.   She relates that the two of them had discussed this prior to her coming into [address] [place] [address]. Vincent's last week. Chan Baltazar got her brother Manav Haddad on the phone and we had a three way phone conversation. I updated brother Manav Haddad on my conversation with his mom. Dottie Collier are both agreeable that their mom has suffered and they want her to be kept comfortable if we can not cure her breathing issues. They both relate they want their mom to make the decision as far as code status, but they both relate they will agree with [facility] if that is what their mom wants. We talked about hospice. Family does not think that patient can be cared for at home. They would like to look into extended care facility and have hospice care at [facility]. Explained that medicare will cover the hospice providers and medications but medicare does not cover the room and board at the [facility]. This would be an out of pocket expense. If patient meets inpatient criteria she would go to [facility] inpatient facility. Explained one has to qualify for this level of care. If she needs inpatient that is covered by medicare. However if they admit her inpatient to hospice and then get her symptoms under control so she no longer qualifies it will cost her out of pocket to stay just like at an [facility] [place]. Chan Baltazar came in to hospital and met with her mom. Resident came and discussed code status and patient decided to make herself a [facility]. Orders received. Spoke with Dolores Cueto and Chan Baltazar and they would like to see if she can go to [facility] or [facility] and have Ghada Boo for hospice care. I contacted [facility] and spoke with staff. She is requesting we send clinicals to office and they will try to get approval with just clinicals. I asked about patient maybe qualifying for inpatient. Staff member relates they may or may not need to come out and see her. I updated Cassidy Kay and Citlalli Rod.   Citlalli Rod case management relates she will send clinicals to [facility] [address] fax number [phone]. Patient has 3 biologic children and is . She has living will and [facility] in her chart at hospital.  There are two [facility] in chart one from 2014 and one from 6/2022. Daughter relates that she does not have the power of  over end of life decisions that is her brother Nela Hagen. She has power of  over all other things. Please see [facility] in temporary chart. Education/support to staff  Education/support to family  Education/support to patient  Communications with primary service  Providing support for coping/adaptation/distress of family  Providing support for coping/adaptation/distress of patient  Discussing meaning/purpose   Decision making regarding life prolonging treatment  Decisional capacity assessed  Continue with current plan of care  Palliative care orders introduced  Provided information about hospice  Validating patient/family distress  Continued communication updates  Recognizing, reflecting, and empathizing with family members' anticipatory grief  Recognizing, reflecting, and empathizing with the patient's anticipatory grief  Discussed [facility] and [facility] code status. Discussed with patient whether she would want cpr and or ventilator. Worked closely with bedside nurse Babs for needed orders and  Billy Jain. Principle Problem/Diagnosis:  Sepsis ([facility] [A41.9]  Sepsis, due to unspecified organism, unspecified whether acute organ dysfunction present ([facility] [A41.9]    Goals of care evaluation:  The patient goals of care are provide comfort care/support/palliation/relieve suffering, preparation for death, achievement of a peaceful death, and patient expresses wishes to seek hospice care.    Goals of care discussed with:    [x] Patient independently    [x] Patient and Family    [] Family or Healthcare DPOA independently    [] Unable to discuss with patient, family/DPOA not present    Code Status  DNR-CCA    Other recommendations:  Please call with any palliative questions or concerns. Palliative Care Team is available via perfect serve or via phone - [phone]. Palliative Care will continue to follow Ms. Zamudio's care as needed. Thank you for allowing Palliative Care to participate in the care of Ms. Zamudio .     Electronically signed by   Briana Lopez RN  Palliative Care Team  on 9/13/2022 at 12:12 PM    Palliative care office: [phone]

## 2022-09-13 NOTE — PROGRESS NOTES
[facility]  Internal Medicine Teaching Residency Program  Inpatient Daily Progress Note  ______________________________________________________________________________    Patient: Ca Araya  YOB: 1935   OON:[id number]    Acct: [de-identified]     Room[facility]  Admit date: 9/10/2022  Today's date: 09/13/22  Number of days in the hospital: 3    SUBJECTIVE   Admitting Diagnosis: Sepsis ([address]  CC: Shortness of breath  Pt examined at bedside. Chart & results reviewed. Vitally the patient and his blood pressure of 137/66, heart rate 90, maintaining saturation on 13 L via high flow nasal cannula. Overnight the patient removed her oxygen, was placed back on 15 L, titrated down to 30 L to maintain saturation. WBC count up from yesterday to 18. 6. Patient anxious, Xanax changed to twice daily. CODE STATUS DNR CCA. ROS:  Constitutional:  negative for chills, fevers, sweats  Respiratory:  negative for cough, dyspnea on exertion, hemoptysis, shortness of breath, wheezing  Cardiovascular:  negative for chest pain, chest pressure/discomfort, lower extremity edema, palpitations  Gastrointestinal:  negative for abdominal pain, constipation, diarrhea, nausea, vomiting  Neurological:  negative for dizziness, headache  BRIEF HISTORY     The patient is a pleasant 81 y.o. female with past medical history of interstitial lung disease, COPD, asthma, hyperlipidemia came to the ER via [facility] due to complaints of shortness of breath. For the last few days patient was feeling short of breath. Patient was recently seen at [address] [place] [address] 3 days ago for similar symptoms. Daughter stated that patient has been having increased hypoxic episode for the past 2 weeks and becomes confused. Patient and daughter denies any fever, productive cough, abdominal pain, headache, nausea, vomiting, diarrhea, weakness. Although there is a slight reduction in oral appetite.   As per patient's chart, patient is having difficulty urination these days. Patient states that she uses compressor for home oxygen since 2020. Patient has to switch on the compressor to deliver oxygen and she also sometimes use nasal cannula for several seconds a few times a day. She is not on continuous oxygen at home. Patient states that her hypoxic episode started 2020. She believed that she had COVID but every test was negative. She went to multiple doctors and eventually was diagnosed with interstitial lung disease. She is taking prednisone 10 mg since 2022. In the ER, patient was hypoxic and was placed on 10 L nonrebreather mask and slowly wean down. Urine analysis was positive for UTI. Preliminary results from blood cultures were negative. Chest x-ray showed diffuse pulmonary infiltrates concerning for pneumonia. Lactic acid was normal.  WBC elevated at 15.7. OBJECTIVE     Vital Signs:  /66   Pulse 90   Temp 97.5 °F (36.4 °C) (Oral)   Resp (!) 39   Ht 5' 1\" (1.549 m)   Wt 110 lb (49.9 kg)   SpO2 94%   BMI 20.78 kg/m²     Temp (24hrs), Av.6 °F (36.4 °C), Min:97.4 °F (36.3 °C), Max:97.9 °F (36.6 °C)    In: 520   Out: 1100 [Urine:1100]    Physical Exam:  Constitutional: This is a well developed, well nourished, 18.5-24.9 - Normal 81y.o. year old female who is alert, oriented, cooperative and in no apparent distress. Head:normocephalic and atraumatic. EENT:  PERRLA. No conjunctival injections. Septum was midline, mucosa was without erythema, exudates or cobblestoning. No thrush was noted. Neck: Supple without thyromegaly. No elevated JVP. Trachea was midline. Respiratory: Chest was symmetrical without dullness to percussion. Breath sounds bilaterally were clear to auscultation. There were no wheezes, rhonchi or rales. There is no intercostal retraction or use of accessory muscles. No egophony noted.    Cardiovascular: Regular without murmur, clicks, gallops or rubs. Abdomen: Slightly rounded and soft without organomegaly. No rebound, rigidity or guarding was appreciated. Lymphatic: No lymphadenopathy. Musculoskeletal: Normal curvature of the spine. No gross muscle weakness. Extremities:  No lower extremity edema, ulcerations, tenderness, varicosities or erythema. Muscle size, tone and strength are normal.  No involuntary movements are noted. Skin:  Warm and dry. Good color, turgor and pigmentation. No lesions or scars.   No cyanosis or clubbing  Neurological/Psychiatric: The patient's general behavior, level of consciousness, thought content and emotional status is normal.        Medications:  Scheduled Medications:    predniSONE  40 mg Oral Daily    sodium chloride flush  5-40 mL IntraVENous 2 times per day    enoxaparin  30 mg SubCUTAneous Daily    cefTRIAXone (ROCEPHIN) IV  2,000 mg IntraVENous Q24H    azithromycin  500 mg IntraVENous Q24H    ipratropium-albuterol  1 ampule Inhalation Q4H WA     Continuous Infusions:    dextrose      sodium chloride       PRN Medicationsalbuterol sulfate HFA, 2 puff, Q6H PRN  ALPRAZolam, 0.25 mg, BID PRN  glucose, 4 tablet, PRN  dextrose bolus, 125 mL, PRN   Or  dextrose bolus, 250 mL, PRN  glucagon (rDNA), 1 mg, PRN  dextrose, , Continuous PRN  dextromethorphan-guaiFENesin, 10 mL, Q6H PRN  zolpidem, 5 mg, Nightly PRN  sodium chloride flush, 5-40 mL, PRN  sodium chloride, , PRN  ondansetron, 4 mg, Q8H PRN   Or  ondansetron, 4 mg, Q6H PRN  polyethylene glycol, 17 g, Daily PRN  acetaminophen, 650 mg, Q6H PRN   Or  acetaminophen, 650 mg, Q6H PRN        Diagnostic Labs:  CBC:   Recent Labs     09/11/22 0527 09/12/22  0436 09/13/22  0619   WBC 19.4* 17.0* 18.6*   RBC 3.82* 3.78* 3.76*   HGB 11.9 11.6* 11.5*   HCT 35.5* 34.8* 34.4*   MCV 92.9 92.1 91.5   RDW 11.8 11.5* 11.8   PLT See Reflexed IPF Result [id number]     BMP:   Recent Labs     09/11/22 0527 09/12/22  0436 09/13/22  0619   * 129* 131*   K 4.3 4.6 4.9   CL 94* 93* 92*   CO2 26 24 32*   BUN 11 15 18   CREATININE 0.60 0.59 0.56     BNP: No results for input(s): BNP in the last 72 hours. PT/INR: No results for input(s): PROTIME, INR in the last 72 hours. APTT: No results for input(s): APTT in the last 72 hours. CARDIAC ENZYMES: No results for input(s): CKMB, CKMBINDEX, TROPONINI in the last 72 hours. Invalid input(s): CKTOTAL;3  FASTING LIPID PANEL:No results found for: CHOL, HDL, TRIG  LIVER PROFILE:   Recent Labs     09/10/22  1245   AST 31   ALT 15   BILITOT 0.5   ALKPHOS 71      MICROBIOLOGY:   Lab Results   Component Value Date/Time    CULTURE NO GROWTH 2 DAYS 09/10/2022 08:22 PM       Imaging:    XR CHEST PORTABLE    Result Date: 9/12/2022  Unchanged diffuse bilateral interstitial and airspace opacity, likely a combination of pulmonary fibrosis and possible superimposed pneumonia. XR CHEST PORTABLE    Result Date: 9/10/2022  Diffuse pulmonary infiltrates consistent with pneumonia. CT CHEST PULMONARY EMBOLISM W CONTRAST    Result Date: 9/10/2022  Pulmonary fibrosis with suggestion of an underlying infectious/inflammatory   Coronary artery/atherosclerotic disease No evidence of pulmonary embolism Mediastinal and hilar lymphadenopathy, probably reactive. ASSESSMENT & PLAN     ASSESSMENT / PLAN:     Principal Problem:    Sepsis ([facility]  Active Problems:    ILD (interstitial lung disease) ([facility]    Acute cystitis without hematuria    IPF (idiopathic pulmonary fibrosis) ([facility]  Resolved Problems:    * No resolved hospital problems. *    Sepsis: Hospice or palliative care for discussion patient. Patient hemodynamically stable. Blood cultures negative, urine cultures positive for E. coli. Continue with ceftriaxone and azithromycin, WBC increased back to 18. 6. COPD exacerbation: Currently on high flow nasal cannula on 13 L. Wean oxygen to maintain saturations of more than 88%. Continue DuoNeb and prednisone 40 mg.     Interstitial

## 2022-09-13 NOTE — SIGNIFICANT EVENT
Resuscitation/Code Status Note on Estelle Gonzalez (YOB: 1935)    At 3:20 PM on September 13, 2022, resuscitation/code status decision was based on a signed valid DNR Identification Form. The code status was made DNR-CC No additional code details.     Electronically signed by Suni Gavin MD on 9/13/22 at 3:27 PM EDT

## 2022-09-13 NOTE — PLAN OF CARE
Patient removed oxygen again, this time she is having a harder time recovering. Oxygen was increased to 15 L. Patient is unable to tolerate bipap. [facility]ory notified. O2 sat are 76%. [address] notified. . sitting at bedside with patient.

## 2022-09-14 NOTE — PROGRESS NOTES
Physical Therapy        Physical Therapy Cancel Note      DATE: 2022    NAME: Hoda White  MRN: [id number]   : 1935      Patient not seen this date for Physical Therapy due to:    Patient is not appropriate for PT evaluation/treatment at this time d/t hospice consult in progress. Will check back tomorrow after family meeting.         Electronically signed by Violeta Bear PTA on 2022 at 8:39 AM

## 2022-09-14 NOTE — PROGRESS NOTES
responded to request to set up Zoom meeting with family.  called daughter, Parvin Prasad, who explained that she will be calling her brothers who live out of state and that other family members will be coming to the hospital.      asked daughter to get email addresses and a time to do the meeting, and to call  with information. Daughter expects to be at the hospital around 1:00pm today.     Electronically signed by Sandi Perry, on 9/14/2022 at 10:30 AM.  [facility] [facility]  [phone]

## 2022-09-14 NOTE — PROGRESS NOTES
[facility]  Internal Medicine Teaching Residency Program  Inpatient Daily Progress Note  ______________________________________________________________________________    Patient: Anne Paredes  YOB: 1935   ODM:[id number]    Acct: [de-identified]     Room: [address] [place] [address]  Admit date: 9/10/2022  Today's date: 09/14/22  Number of days in the hospital: 4    SUBJECTIVE   Admitting Diagnosis: Sepsis ([address]  CC: Shortness of breath  Pt examined at bedside. Chart & results reviewed. Vitally the patient and his blood pressure of 138/58, heart rate 88, maintaining saturation on 11 L via high flow nasal cannula. No acute overnight events  WBC count ranges between 18.6-16.4  Patient anxious, Xanax changed to twice daily. CODE STATUS DNR CCA. ROS:  Constitutional:  negative for chills, fevers, sweats  Respiratory:  negative for cough, dyspnea on exertion, hemoptysis, shortness of breath, wheezing  Cardiovascular:  negative for chest pain, chest pressure/discomfort, lower extremity edema, palpitations  Gastrointestinal:  negative for abdominal pain, constipation, diarrhea, nausea, vomiting  Neurological:  negative for dizziness, headache  BRIEF HISTORY     The patient is a pleasant 81 y.o. female with past medical history of interstitial lung disease, COPD, asthma, hyperlipidemia came to the ER via [facility] due to complaints of shortness of breath. For the last few days patient was feeling short of breath. Patient was recently seen at [address] [place] [address] 3 days ago for similar symptoms. Daughter stated that patient has been having increased hypoxic episode for the past 2 weeks and becomes confused. Patient and daughter denies any fever, productive cough, abdominal pain, headache, nausea, vomiting, diarrhea, weakness. Although there is a slight reduction in oral appetite. As per patient's chart, patient is having difficulty urination these days.      Patient states that she uses compressor for home oxygen since 2020. Patient has to switch on the compressor to deliver oxygen and she also sometimes use nasal cannula for several seconds a few times a day. She is not on continuous oxygen at home. Patient states that her hypoxic episode started 2020. She believed that she had COVID but every test was negative. She went to multiple doctors and eventually was diagnosed with interstitial lung disease. She is taking prednisone 10 mg since 2022. In the ER, patient was hypoxic and was placed on 10 L nonrebreather mask and slowly wean down. Urine analysis was positive for UTI. Preliminary results from blood cultures were negative. Chest x-ray showed diffuse pulmonary infiltrates concerning for pneumonia. Lactic acid was normal.  WBC elevated at 15.7. OBJECTIVE     Vital Signs:  BP (!) 138/58   Pulse 88   Temp 98.2 °F (36.8 °C) (Oral)   Resp 17   Ht 5' 1\" (1.549 m)   Wt 110 lb (49.9 kg)   SpO2 96%   BMI 20.78 kg/m²     Temp (24hrs), Av °F (36.7 °C), Min:97.5 °F (36.4 °C), Max:98.2 °F (36.8 °C)    In: 241.7   Out: 800 [Urine:800]    Physical Exam:  Constitutional: This is a well developed, well nourished, 18.5-24.9 - Normal 81y.o. year old female who is alert, oriented, cooperative and in no apparent distress. Head:normocephalic and atraumatic. EENT:  PERRLA. No conjunctival injections. Septum was midline, mucosa was without erythema, exudates or cobblestoning. No thrush was noted. Neck: Supple without thyromegaly. No elevated JVP. Trachea was midline. Respiratory: Chest was symmetrical without dullness to percussion. Breath sounds bilaterally were clear to auscultation. There were no wheezes, rhonchi or rales. There is no intercostal retraction or use of accessory muscles. No egophony noted. Cardiovascular: Regular without murmur, clicks, gallops or rubs. Abdomen: Slightly rounded and soft without organomegaly.  No rebound, rigidity or guarding was appreciated. Lymphatic: No lymphadenopathy. Musculoskeletal: Normal curvature of the spine. No gross muscle weakness. Extremities:  No lower extremity edema, ulcerations, tenderness, varicosities or erythema. Muscle size, tone and strength are normal.  No involuntary movements are noted. Skin:  Warm and dry. Good color, turgor and pigmentation. No lesions or scars.   No cyanosis or clubbing  Neurological/Psychiatric: The patient's general behavior, level of consciousness, thought content and emotional status is normal.        Medications:  Scheduled Medications:    predniSONE  40 mg Oral Daily    azithromycin  500 mg IntraVENous Q24H    cefTRIAXone (ROCEPHIN) IV  2,000 mg IntraVENous Q24H     Continuous Infusions:    dextrose      sodium chloride       PRN Medicationsalbuterol sulfate HFA, 2 puff, Q6H PRN  ALPRAZolam, 0.5 mg, 4x Daily PRN  benzonatate, 100 mg, TID PRN  bisacodyl, 5 mg, Daily PRN  loperamide, 2 mg, PRN  glycopyrrolate, 0.2 mg, Q4H PRN  morphine, 2 mg, Q2H PRN   Or  morphine, 4 mg, Q2H PRN  morphine, 5 mg, Q2H PRN  acetaminophen, 650 mg, Q4H PRN  LORazepam, 1 mg, Q2H PRN  glucose, 4 tablet, PRN  dextrose bolus, 125 mL, PRN   Or  dextrose bolus, 250 mL, PRN  glucagon (rDNA), 1 mg, PRN  dextrose, , Continuous PRN  dextromethorphan-guaiFENesin, 10 mL, Q6H PRN  zolpidem, 5 mg, Nightly PRN  sodium chloride flush, 5-40 mL, PRN  sodium chloride, , PRN  ondansetron, 4 mg, Q8H PRN   Or  ondansetron, 4 mg, Q6H PRN  polyethylene glycol, 17 g, Daily PRN      Diagnostic Labs:  CBC:   Recent Labs     09/12/22  0436 09/13/22  0619 09/14/22  0513   WBC 17.0* 18.6* 16.4*   RBC 3.78* 3.76* 4.35   HGB 11.6* 11.5* 12.9   HCT 34.8* 34.4* 40.8   MCV 92.1 91.5 93.8   RDW 11.5* 11.8 11.8    415 439     BMP:   Recent Labs     09/12/22  0436 09/13/22  0619 09/14/22  0513   * 131* 127*   K 4.6 4.9 5.4*   CL 93* 92* 87*   CO2 24 32* 33*   BUN 15 18 16   CREATININE 0.59 0.56 0.48* BNP: No results for input(s): BNP in the last 72 hours. PT/INR: No results for input(s): PROTIME, INR in the last 72 hours. APTT: No results for input(s): APTT in the last 72 hours. CARDIAC ENZYMES: No results for input(s): CKMB, CKMBINDEX, TROPONINI in the last 72 hours. Invalid input(s): CKTOTAL;3  FASTING LIPID PANEL:No results found for: CHOL, HDL, TRIG  LIVER PROFILE:   No results for input(s): AST, ALT, ALB, BILIDIR, BILITOT, ALKPHOS in the last 72 hours. MICROBIOLOGY:   Lab Results   Component Value Date/Time    CULTURE NO GROWTH 3 DAYS 09/10/2022 08:22 PM       Imaging:    XR CHEST PORTABLE    Result Date: 9/12/2022  Unchanged diffuse bilateral interstitial and airspace opacity, likely a combination of pulmonary fibrosis and possible superimposed pneumonia. XR CHEST PORTABLE    Result Date: 9/10/2022  Diffuse pulmonary infiltrates consistent with pneumonia. CT CHEST PULMONARY EMBOLISM W CONTRAST    Result Date: 9/10/2022  Pulmonary fibrosis with suggestion of an underlying infectious/inflammatory   Coronary artery/atherosclerotic disease No evidence of pulmonary embolism Mediastinal and hilar lymphadenopathy, probably reactive. ASSESSMENT & PLAN     ASSESSMENT / PLAN:     Principal Problem:    Sepsis ([facility]  Active Problems:    ILD (interstitial lung disease) ([facility]    Acute cystitis without hematuria    IPF (idiopathic pulmonary fibrosis) ([facility]  Resolved Problems:    * No resolved hospital problems. *    Sepsis: Hospice or palliative care for discussion patient. Patient hemodynamically stable. Blood cultures negative, urine cultures positive for E. coli. Continue with ceftriaxone and azithromycin, WBC increased back to 18. 6. COPD exacerbation: Currently on high flow nasal cannula on 11 L. Wean oxygen to maintain saturations of more than 88%. Continue DuoNeb and prednisone 40 mg. Interstitial lung disease: Continue prednisone 40 mg.   Patient takes prednisone 10 at home    Acute cystitis without hematuria: Urine culture positive for E. coli. Continue ceftriaxone and azithromycin. DVT ppx : Lovenox  GI ppx: Not indicated    PT/OT: Consulted  Discharge Planning / SW: In progress    Anselmo Tanner MD  Internal Medicine Resident, PGY-1  [address], [place]  9/14/2022, 10:24 AM    Attending Physician Statement  I have discussed the care of [facility], including pertinent history and exam findings,  with the resident. I have seen and examined the patient and the key elements of all parts of the encounter have been performed by me. I agree with the assessment, plan and orders as documented by the resident with additions . E. coli sensitive to Cipro. If these arrangements are made to transfer her to a facility we can stop the IV antibiotics and put her on Cipro p.o. for few days for the UTI. Her overall prognosis is very poor. We will continue present therapy at this time. She had help from the palliative medicine. Dictated by Dr. Isabel Correa with nursing staff in detail , all questions answered . Electronically signed by Piyush Sinclair MD on   9/14/22 at 1:35 PM EDT    Please note that this chart was generated using voice recognition Dragon dictation software. Although every effort was made to ensure the accuracy of this automated transcription, some errors in transcription may have occurred.

## 2022-09-14 NOTE — PROGRESS NOTES
Palliative Care Updates:  Received call from Sincere Rosario at [facility]. They are requesting to have their [address] speak with patient's nurse to determine if she meets criteria for inpatient. I was notified she is accepted into hospice. I obtained bedside nurse phone number [phone]. I spoke with  Nestor Jacome and nurse Vince Hernandez and let them know that [facility] nurse will be calling Vince Hernandez. Updates received. Pt is on 12 liters high flow salter. I will update daughter. Called and spoke with Sincere Rosario at [facility]. Gave her bedside nurse number as well as patient's 's direct number. I called and spoke with Noe Moe, updated her on mom's condition. I let her know [address] is accepted into hospice and we are in process of determining if she will need inpatient center verses ECF. We also discussed that it may be difficult to transport patient due to high oxygen need. I let her know we will update her more as we have more information. I offered a zoom meeting for her brothers from out of state if they would like that. As per staff nurse explained that antibiotics continue for UTI. Comfort meds continue. Noe Moe will call her brother Sue Christy now. I will wait to hear back from them regarding zoom meeting. Left message for  and let her know that I spoke with patient's daughter. 1500 Spoke with Nathalie related concern for transport to hospice in [place]. Pt is on 12 liters high flow salter. Pt would transfer with non rebreather or bipap. Noe Moe asks about a hospice here local if transport is a concern. I explained that we could have [address] come out and evaluate patient at bedside. If patient meets inpatient criteria with them nurse can determine if patient is transferable or if we should convert bed to hospice bed here at [facility]. V's. Noe Moe relates that she wants to check with her brothers.       Spoke with  and she relates [facility] relates that they would like to have [address] see patient. COURTNEY called [address] with referral to intake and will call Venice Lorenz to let them know we are changing hospice to [place] location. Will continue to follow from palliative care.         [facility]  Jairo MICHAELN, [facility]  [address] [phone]

## 2022-09-14 NOTE — PLAN OF CARE
Problem: Respiratory - Adult  Goal: Achieves optimal ventilation and oxygenation  9/13/2022 2156 by Isabel Lawton RCP  Outcome: Progressing  Flowsheets (Taken 9/13/2022 1930 by Tess Melendez RN)  Achieves optimal ventilation and oxygenation:   Assess for changes in respiratory status   Assess for changes in mentation and behavior   Position to facilitate oxygenation and minimize respiratory effort   Oxygen supplementation based on oxygen saturation or arterial blood gases   Initiate smoking cessation protocol as indicated   Encourage broncho-pulmonary hygiene including cough, deep breathe, incentive spirometry   Assess the need for suctioning and aspirate as needed   Assess and instruct to report shortness of breath or any respiratory difficulty   Respiratory therapy support as indicated  9/13/2022 1807 by Augustine Hong RCP  Outcome: Not Progressing

## 2022-09-14 NOTE — PLAN OF CARE
Problem: Discharge Planning  Goal: Discharge to home or other facility with appropriate resources  9/14/2022 1937 by Ricardo Durand RN  Outcome: Progressing  9/14/2022 1937 by Ricardo Durand RN  Outcome: Progressing     Problem: Safety - Adult  Goal: Free from fall injury  9/14/2022 1937 by Ricardo Durand RN  Outcome: Progressing  9/14/2022 1937 by Ricardo Durand RN  Outcome: Progressing     Problem: Skin/Tissue Integrity  Goal: Absence of new skin breakdown  Description: 1. Monitor for areas of redness and/or skin breakdown  2. Assess vascular access sites hourly  3. Every 4-6 hours minimum:  Change oxygen saturation probe site  4. Every 4-6 hours:  If on nasal continuous positive airway pressure, respiratory therapy assess nares and determine need for appliance change or resting period. Outcome: Progressing     Problem: Respiratory - Adult  Goal: Achieves optimal ventilation and oxygenation  Outcome: Progressing     Problem: ABCDS Injury Assessment  Goal: Absence of physical injury  Outcome: Progressing     Problem: Pain  Goal: Verbalizes/displays adequate comfort level or baseline comfort level  Outcome: Progressing     Problem: Nutrition Deficit:  Goal: Optimize nutritional status  Outcome: Progressing     Problem: Coping  Goal: Pt/Family able to verbalize concerns and demonstrate effective coping strategies  Description: INTERVENTIONS:  1. Assist patient/family to identify coping skills, available support systems and cultural and spiritual values  2. Provide emotional support, including active listening and acknowledgement of concerns of patient and caregivers  3. Reduce environmental stimuli, as able  4. Instruct patient/family in relaxation techniques, as appropriate  5.  Assess for spiritual pain/suffering and initiate Spiritual Care, Psychosocial Clinical Specialist consults as needed  Outcome: Progressing     Problem: Death & Dying  Goal: Pt/Family communicate acceptance of impending death and feel psychological comfort and peace  Description: INTERVENTIONS:  1. Assess patient/family anxiety and grief process related to end of life issues  2. Provide emotional and spiritual support  3. Provide information about the patient's health status with consideration of family and cultural values  4. Communicate willingness to discuss death and facilitate grief process  with patient/family as appropriate  5. Emphasize sustaining relationships within family system and community, or nicole/spiritual traditions  6.  Initiate Spiritual Care, Psychosocial Clinical Specialist, consult as needed  Outcome: Progressing     Problem: Musculoskeletal - Adult  Goal: Return mobility to safest level of function  Outcome: Progressing

## 2022-09-15 NOTE — PROGRESS NOTES
Physical Therapy        Physical Therapy Cancel Note      DATE: 9/15/2022    NAME: Cyndie Cabral  MRN: [id number]   : 1935      Patient not seen this date for Physical Therapy due to:    Patient Declined: Pt states \"I'm not moving . I just want to die\". RN notified.       Electronically signed by Jose Lemus PTA on 9/15/2022 at 9:43 AM

## 2022-09-15 NOTE — PROGRESS NOTES
Occupational [address]  Occupational Therapy Not Seen Note    DATE: 9/15/2022    NAME: Sandrine Hewitt  MRN: [id number]   : 1935      Patient not seen this date for Occupational Therapy due to: Other: Hospice consult. Pt declined participation in therapy this day.     Next Scheduled Treatment:     Electronically signed by CRYSTAL Lala on 9/15/2022 at 12:17 PM

## 2022-09-15 NOTE — PROGRESS NOTES
[facility]  Internal Medicine Teaching Residency Program  Inpatient Daily Progress Note  ______________________________________________________________________________    Patient: Santiago Goodman  YOB: 1935   DXD:[id number]    Acct: [de-identified]     Room: 92/4679-30  Admit date: 9/10/2022  Today's date: 09/15/22  Number of days in the hospital: 5    SUBJECTIVE   Admitting Diagnosis: Sepsis ([address]  CC: Shortness of breath  Pt examined at bedside. Chart & results reviewed. Vitally the patient and his blood pressure of 116/62, heart rate 102  No acute overnight events  Patient was on 11 L high flow nasal cannula satting at 88%  CODE STATUS DNR CC  Planning the patient to discharge to hospice    ROS:  Constitutional:  negative for chills, fevers, sweats  Respiratory:  negative for cough, dyspnea on exertion, hemoptysis, shortness of breath, wheezing  Cardiovascular:  negative for chest pain, chest pressure/discomfort, lower extremity edema, palpitations  Gastrointestinal:  negative for abdominal pain, constipation, diarrhea, nausea, vomiting  Neurological:  negative for dizziness, headache  BRIEF HISTORY     The patient is a pleasant 81 y.o. female with past medical history of interstitial lung disease, COPD, asthma, hyperlipidemia came to the ER via [facility] due to complaints of shortness of breath. For the last few days patient was feeling short of breath. Patient was recently seen at [address] [place] [address] 3 days ago for similar symptoms. Daughter stated that patient has been having increased hypoxic episode for the past 2 weeks and becomes confused. Patient and daughter denies any fever, productive cough, abdominal pain, headache, nausea, vomiting, diarrhea, weakness. Although there is a slight reduction in oral appetite. As per patient's chart, patient is having difficulty urination these days.      Patient states that she uses compressor for home oxygen since 2020. Patient has to switch on the compressor to deliver oxygen and she also sometimes use nasal cannula for several seconds a few times a day. She is not on continuous oxygen at home. Patient states that her hypoxic episode started 2020. She believed that she had COVID but every test was negative. She went to multiple doctors and eventually was diagnosed with interstitial lung disease. She is taking prednisone 10 mg since 2022. In the ER, patient was hypoxic and was placed on 10 L nonrebreather mask and slowly wean down. Urine analysis was positive for UTI. Preliminary results from blood cultures were negative. Chest x-ray showed diffuse pulmonary infiltrates concerning for pneumonia. Lactic acid was normal.  WBC elevated at 15.7. OBJECTIVE     Vital Signs:  /60   Pulse 87   Temp 97.4 °F (36.3 °C) (Axillary)   Resp 23   Ht 5' 1\" (1.549 m)   Wt 110 lb (49.9 kg)   SpO2 92%   BMI 20.78 kg/m²     Temp (24hrs), Av.1 °F (36.7 °C), Min:97.4 °F (36.3 °C), Max:98.9 °F (37.2 °C)    In: 241.7   Out: -     Physical Exam:  Constitutional: This is a well developed, well nourished, 18.5-24.9 - Normal 81y.o. year old female who is alert, oriented, cooperative and in no apparent distress. Head:normocephalic and atraumatic. EENT:  PERRLA. No conjunctival injections. Septum was midline, mucosa was without erythema, exudates or cobblestoning. No thrush was noted. Neck: Supple without thyromegaly. No elevated JVP. Trachea was midline. Respiratory: Chest was symmetrical without dullness to percussion. Breath sounds bilaterally were clear to auscultation. There were no wheezes, rhonchi or rales. There is no intercostal retraction or use of accessory muscles. No egophony noted. Cardiovascular: Regular without murmur, clicks, gallops or rubs. Abdomen: Slightly rounded and soft without organomegaly. No rebound, rigidity or guarding was appreciated.     Lymphatic: No hours.  PT/INR: No results for input(s): PROTIME, INR in the last 72 hours. APTT: No results for input(s): APTT in the last 72 hours. CARDIAC ENZYMES: No results for input(s): CKMB, CKMBINDEX, TROPONINI in the last 72 hours. Invalid input(s): CKTOTAL;3  FASTING LIPID PANEL:No results found for: CHOL, HDL, TRIG  LIVER PROFILE:   No results for input(s): AST, ALT, ALB, BILIDIR, BILITOT, ALKPHOS in the last 72 hours. MICROBIOLOGY:   Lab Results   Component Value Date/Time    CULTURE NO GROWTH 4 DAYS 09/10/2022 08:22 PM       Imaging:    XR CHEST PORTABLE    Result Date: 9/12/2022  Unchanged diffuse bilateral interstitial and airspace opacity, likely a combination of pulmonary fibrosis and possible superimposed pneumonia. XR CHEST PORTABLE    Result Date: 9/10/2022  Diffuse pulmonary infiltrates consistent with pneumonia. CT CHEST PULMONARY EMBOLISM W CONTRAST    Result Date: 9/10/2022  Pulmonary fibrosis with suggestion of an underlying infectious/inflammatory   Coronary artery/atherosclerotic disease No evidence of pulmonary embolism Mediastinal and hilar lymphadenopathy, probably reactive. ASSESSMENT & PLAN     ASSESSMENT / PLAN:     Principal Problem:    Sepsis ([address]  Active Problems:    ILD (interstitial lung disease) ([address]    Acute cystitis without hematuria    IPF (idiopathic pulmonary fibrosis) ([facility]  Resolved Problems:    * No resolved hospital problems. *    Sepsis: Hospice or palliative care for discussion patient. Patient hemodynamically stable. Blood cultures negative, urine cultures positive for E. coli. COPD exacerbation: Currently on high flow nasal cannula on 11 L. Wean oxygen to maintain saturations of more than 88%. Continue DuoNeb and prednisone 40 mg. Interstitial lung disease: Continue prednisone 40 mg. Patient takes prednisone 10 at home    Acute cystitis without hematuria: Urine culture positive for E. coli. Continue ceftriaxone and azithromycin.       DVT ppx : Lovenox  GI ppx: Not indicated    PT/OT: Consulted  Discharge Planning / SW: In progress-transfer to hospice    Ashlyn Kelley MD  Internal Medicine Resident, PGY-1  [address], [place]  9/15/2022, 12:43 AM.  Attending Physician Statement  I have discussed the care of [facility], including pertinent history and exam findings,  with the resident. I have seen and examined the patient and the key elements of all parts of the encounter have been performed by me. I agree with the assessment, plan and orders as documented by the resident with additions . Severe hypoxic respiratory failure secondary to interstitial lung disease with possibly superimposed infection overall poor prognosis continue present management and CODE STATUS    Treatment plan Discussed with nursing staff in detail , all questions answered . Electronically signed by Sonya Alegria MD on   9/15/22 at 2:23 PM EDT    Please note that this chart was generated using voice recognition Dragon dictation software. Although every effort was made to ensure the accuracy of this automated transcription, some errors in transcription may have occurred.

## 2022-09-15 NOTE — PROGRESS NOTES
report of decreased PO intake)    Nutrition Interventions:   Food and/or Nutrient Delivery: Continue Current Diet, Continue Oral Nutrition Supplement  Nutrition Education/Counseling: No recommendation at this time  Coordination of Nutrition Care: Continue to monitor while inpatient       Goals:  Previous Goal Met:  (goal set)  Goals: PO intake 50% or greater, prior to discharge       Nutrition Monitoring and Evaluation:   Behavioral-Environmental Outcomes: None Identified  Food/Nutrient Intake Outcomes: Food and Nutrient Intake, Supplement Intake  Physical Signs/Symptoms Outcomes: Weight, Biochemical Data, Nutrition Focused Physical Findings    Discharge Planning:    Continue Oral Nutrition Supplement     Snow Escobar, [address]: [id number]

## 2022-09-15 NOTE — PROGRESS NOTES
Occupational Therapy  Facility/Department: [facility]  Occupational Daily Treatment Note    Name: Valeria Delacruz  : 1935  MRN: [id number]  Date of Service: 9/15/2022    Discharge Recommendations:  Patient would benefit from continued therapy after discharge    Patient Diagnosis(es): The encounter diagnosis was Sepsis, due to unspecified organism, unspecified whether acute organ dysfunction present [facility]). Past Medical History:  has no past medical history on file. Past Surgical History:  has no past surgical history on file. Assessment   Performance deficits / Impairments: Decreased functional mobility ; Decreased ADL status; Decreased strength;Decreased safe awareness;Decreased endurance;Decreased balance;Decreased high-level IADLs;Decreased coordination;Decreased posture  Prognosis: Good  Activity Tolerance  Activity Tolerance: Patient Tolerated treatment well        Plan   Plan  Times per Week: 3-4x  Current Treatment Recommendations: Strengthening, Balance training, Functional mobility training, Endurance training, Gait training, Safety education & training, Patient/Caregiver education & training, Equipment evaluation, education, & procurement, Self-Care / ADL, Home management training     Restrictions  Restrictions/Precautions  Restrictions/Precautions: Fall Risk, General Precautions, Up as Tolerated  Required Braces or Orthoses?: No  Position Activity Restriction  Other position/activity restrictions: 5L O2 at baseline  Pain assessment: Pt denies pain this day. Subjective   Safety Devices  Type of Devices: All fall risk precautions in place;Call light within reach; Chair alarm in place;Gait belt;Nurse notified; Left in chair  Balance  Sitting: Without support (Supervision seated EOB.)  Standing: With support (CGA static standing EOB using RW, functional mobility across room.  Total time 3 minutes.)  Gait  Overall Level of Assistance: Contact-guard assistance  Assistive Device: Walker, rolling ADL  Feeding: Setup;Supervision  LE Dressing: Maximum assistance (Don socks seated EOB. Pt stated unable to reach to put socks on and daughter does this for pt.)  Additional Comments: Pt completed supine/sit transfer to seated EOB/ Sit/stand transfer from EOB using RW for static standing EOB. Functional mobility across room to sit in chair. Pt states not sleeping well during night. Declined ADL care this day. Pt set up with breakfast tray on tray table in front of pt at end of session. Bed mobility  Supine to Sit: Supervision  Scooting: Supervision  Transfers  Slide Board: Contact guard assistance  Stand to sit: Stand by assistance  Transfer Comments: Verbal cues for hand placement with good return. Cognition  Overall Cognitive Status: WFL  Orientation  Overall Orientation Status: Within Functional Limits     Education Given To: Patient  Education Provided Comments: OT POC, transfer/walker safety, importance of participation in therapy with fair return. AM-PAC Score        AM-PAC Inpatient Daily Activity Raw Score: 19 (09/15/22 1203)  AM-PAC Inpatient ADL T-Scale Score : 40.22 (09/15/22 1203)  ADL Inpatient CMS 0-100% Score: 42.8 (09/15/22 1203)  ADL Inpatient CMS G-Code Modifier : CK (09/15/22 1203)  Goals  Short Term Goals  Time Frame for Short term goals: Pt will by discharge  Short Term Goal 1: demo UB bathing/dressing independently. Short Term Goal 2: demo LB bathing/dressing at SBA, AE PRN. Short Term Goal 3: demo dynamic standing during func activities for 5+ mins at SBA, LRD PRN, no rest breaks. Short Term Goal 4: demo functional transfers at SUP, LRD PRN, including toilet transfers. Short Term Goal 5: demo pursed lip breathing technique throughout session with 1 vc.        Therapy Time   Individual Concurrent Group Co-treatment   Time In 0908         Time Out 0916         Minutes 8         Timed Code Treatment Minutes: 8 Minutes    Pt supine in bed upon therapist arrival. Pleasant and agreeable to therapy. See above for LOF for all tasks. Pt retired to seated in chair at end of session with chair alarm activated and call light within reach.       HÉCTOR Hooks/DEVIN

## 2022-09-15 NOTE — PLAN OF CARE
Problem: Discharge Planning  Goal: Discharge to home or other facility with appropriate resources  9/15/2022 0919 by Jono Mirza RN  Outcome: Progressing  9/14/2022 1937 by Yani Walker RN  Outcome: Progressing     Problem: Safety - Adult  Goal: Free from fall injury  9/15/2022 0919 by Jono Mirza RN  Outcome: Progressing  9/14/2022 1937 by Yani Walker RN  Outcome: Progressing     Problem: Skin/Tissue Integrity  Goal: Absence of new skin breakdown  Description: 1. Monitor for areas of redness and/or skin breakdown  2. Assess vascular access sites hourly  3. Every 4-6 hours minimum:  Change oxygen saturation probe site  4. Every 4-6 hours:  If on nasal continuous positive airway pressure, respiratory therapy assess nares and determine need for appliance change or resting period.   9/15/2022 0919 by Jono Mirza RN  Outcome: Progressing  9/14/2022 1937 by Yani Walker RN  Outcome: Progressing     Problem: Respiratory - Adult  Goal: Achieves optimal ventilation and oxygenation  9/15/2022 0919 by Jono Mirza RN  Outcome: Progressing  9/14/2022 1937 by Yani Walker RN  Outcome: Progressing     Problem: ABCDS Injury Assessment  Goal: Absence of physical injury  9/15/2022 0919 by Jono Mirza RN  Outcome: Progressing  9/14/2022 1937 by Yani Walker RN  Outcome: Progressing     Problem: Pain  Goal: Verbalizes/displays adequate comfort level or baseline comfort level  9/15/2022 0919 by Jono Mirza RN  Outcome: Progressing  Flowsheets  Taken 9/15/2022 0000 by Darryle Mealy, RN  Verbalizes/displays adequate comfort level or baseline comfort level: Encourage patient to monitor pain and request assistance  Taken 9/14/2022 2030 by Darryle Mealy, RN  Verbalizes/displays adequate comfort level or baseline comfort level: Encourage patient to monitor pain and request assistance  9/14/2022 1937 by Yani Walker RN  Outcome: Progressing Problem: Nutrition Deficit:  Goal: Optimize nutritional status  9/15/2022 0919 by Mishel Mckeon RN  Outcome: Progressing  9/14/2022 1937 by Jenelle Jj RN  Outcome: Progressing     Problem: Coping  Goal: Pt/Family able to verbalize concerns and demonstrate effective coping strategies  Description: INTERVENTIONS:  1. Assist patient/family to identify coping skills, available support systems and cultural and spiritual values  2. Provide emotional support, including active listening and acknowledgement of concerns of patient and caregivers  3. Reduce environmental stimuli, as able  4. Instruct patient/family in relaxation techniques, as appropriate  5. Assess for spiritual pain/suffering and initiate Spiritual Care, Psychosocial Clinical Specialist consults as needed  9/15/2022 0919 by Mishel Mckeon RN  Outcome: Progressing  9/14/2022 1937 by Jenelle Jj RN  Outcome: Progressing     Problem: Death & Dying  Goal: Pt/Family communicate acceptance of impending death and feel psychological comfort and peace  Description: INTERVENTIONS:  1. Assess patient/family anxiety and grief process related to end of life issues  2. Provide emotional and spiritual support  3. Provide information about the patient's health status with consideration of family and cultural values  4. Communicate willingness to discuss death and facilitate grief process  with patient/family as appropriate  5. Emphasize sustaining relationships within family system and community, or nicole/spiritual traditions  6.  Initiate Spiritual Care, Psychosocial Clinical Specialist, consult as needed  9/15/2022 0919 by Mishel Mckeon RN  Outcome: Progressing  9/14/2022 1937 by Jenelle Jj RN  Outcome: Progressing     Problem: Musculoskeletal - Adult  Goal: Return mobility to safest level of function  9/15/2022 0919 by Mishel Mcekon RN  Outcome: Progressing  9/14/2022 1937 by Jenelle Jj RN  Outcome: Progressing

## 2022-09-16 NOTE — DISCHARGE SUMMARY
[facility]     Department of Internal Medicine - Staff Internal Medicine Teaching Service    INPATIENT DISCHARGE SUMMARY      Patient Identification:  Coleman Blankenship is a 81 y.o. female. :  1935  MRN: [id number]     Acct: [de-identified]   PCP: No primary care provider on file. Admit Date:  9/10/2022  Discharge date and time: 2022  2:48 PM   Attending Provider: No att. providers found                                     [facility] Problem Lists:  Principal Problem:    Sepsis ([facility] [address]  Active Problems:    ILD (interstitial lung disease) ([facility] [address]    Acute cystitis without hematuria    IPF (idiopathic pulmonary fibrosis) ([facility] [address]  Resolved Problems:    * No resolved hospital problems. *      HOSPITAL STAY     Brief Inpatient course:   Coleman Blankenship is a 81 y.o. female who was admitted for the management of Sepsis [facility]), presented to the emergency department [facility] due to complaints of shortness of breath. For the last few days patient was feeling short of breath. Patient was recently seen at [facility] for similar symptoms. Patient was found to have COPD exacerbation in the background of ILD requiring high oxygen to maintain saturation. Her oxygen demand kept increasing and a rapid was also called when patient desat due to respiratory distress. Work-up was positive for E. Coli UTI, with negative blood cultures. She was given ceftriaxone azithromycin along with duo nebs and prednisone to help breathing. On second day of admission, patient changed her code status to [facility] initially, later changed to [facility]. Palliative was consulted and plan was made to transfer the patient to a Hospice facility.     Procedures/ Significant Interventions:    None    Consults:   Consults:     Final Specialist Recommendations/Findings:   IP CONSULT TO INTERNAL MEDICINE  IP CONSULT TO CASE MANAGEMENT  PALLIATIVE CARE EVAL  IP CONSULT TO [address] [place] [address] Hospital Acquired Infections: none    Discharge Functional Status:  stable    DISCHARGE PLAN     Disposition: Hospice    Patient Instructions:   Discharge Medication List as of 9/16/2022  2:49 PM        CONTINUE these medications which have NOT CHANGED    Details   zolpidem (AMBIEN) 5 MG tablet Take 5 mg by mouth nightly as needed for Sleep. Historical Med      ALPRAZolam (XANAX) 0.25 MG tablet Take 0.25 mg by mouth nightly as needed for Sleep. Historical Med      albuterol sulfate HFA (VENTOLIN HFA) 108 (90 Base) MCG/ACT inhaler Inhale 2 puffs into the lungs every 6 hours as needed for WheezingHistorical Med             Activity: activity as tolerated    Diet: regular diet    Follow-up:    No follow-up provider specified. Martinez Walters MD, MD  Internal Medicine Resident, PGY-1  [place], [place]  9/16/2022, 4:21 PM

## 2022-09-16 NOTE — PROGRESS NOTES
PALLIATIVE CARE NURSING ASSESSMENT    Patient: Santiago Goodman  Room: 5142/4891-40    Reason For Consult   Goals of care evaluation  Distress management  Guidance and support  Facilitate communications  Assistance in coordinating care    Code Status: [facility]    Summary:  Palliative Care rounds made. Pt resting, appears comfortable, pulse ox 100% on non rebreather. Hospice meeting scheduled for Saturday moved up to today. Pt converted to hospice bed here. Palliative Care to follow as needed  Continue comfort care measures. Impression: Santiago Goodman is a 81y.o. year old female  has no past medical history on file. .  Currently hospitalized for the management of sepsis. The Palliative Care Team is following to assist with patient and family support, goals of care. Vital Signs  Blood pressure (!) 142/63, pulse 87, resp. rate 22, SpO2 100 %. Patient Active Problem List   Diagnosis    Sepsis ([address]    ILD (interstitial lung disease) ([facility] [address]    Acute cystitis without hematuria    IPF (idiopathic pulmonary fibrosis) ([facility] [address]       Palliative Interaction: Reviewed course of care. Patient now on non rebreather mask. Updates received from Diann Correa. He relates hospice meeting 2:30pm Saturday, comfort measures continue. 1542: Update, hospice able to come in today to meet with patient and daughter. Goals/Plan of care  Education/support to staff  Communications with primary service  Continue with current plan of care  Hospice evaluation done.        Palliative Care Coordinator  Junior MOREL, RN, ONN-CG    [place] Office: [address] [place] Office: [phone]  [facility] Office: [phone]    For Symptom Management Clinic scheduling please call [phone]

## 2022-09-16 NOTE — PROGRESS NOTES
[facility]  Internal Medicine Teaching Residency Program  Inpatient Daily Progress Note  ______________________________________________________________________________    Patient: Ronda Hoffman  YOB: 1935   PUV:[id number]    Acct: [de-identified]     Room: [facility]2987-  Admit date: 9/10/2022  Today's date: 09/16/22      SUBJECTIVE   Admitting Diagnosis: Sepsis ([facility] [address]  CC: Shortness of breath  Pt examined at bedside. Chart & results reviewed. Vitally the patient and his blood pressure of 142/63, heart rate 87  Patient is feeling down and was lethargic on exam   No acute overnight events  Patient was on 11 L high flow nasal cannula satting at 88%  CODE STATUS DNR CC  Planning the patient to discharge to hospice    ROS:  Constitutional:  negative for chills, fevers, sweats  Respiratory:  negative for cough, dyspnea on exertion, hemoptysis, shortness of breath, wheezing  Cardiovascular:  negative for chest pain, chest pressure/discomfort, lower extremity edema, palpitations  Gastrointestinal:  negative for abdominal pain, constipation, diarrhea, nausea, vomiting  Neurological:  negative for dizziness, headache  BRIEF HISTORY     The patient is a pleasant 81 y.o. female with past medical history of interstitial lung disease, COPD, asthma, hyperlipidemia came to the ER via [facility] due to complaints of shortness of breath. For the last few days patient was feeling short of breath. Patient was recently seen at [address] [place] [address] 3 days ago for similar symptoms. Daughter stated that patient has been having increased hypoxic episode for the past 2 weeks and becomes confused. Patient and daughter denies any fever, productive cough, abdominal pain, headache, nausea, vomiting, diarrhea, weakness. Although there is a slight reduction in oral appetite. As per patient's chart, patient is having difficulty urination these days.      Patient states that she uses compressor for appreciated. Lymphatic: No lymphadenopathy. Musculoskeletal: Normal curvature of the spine. No gross muscle weakness. Extremities:  No lower extremity edema, ulcerations, tenderness, varicosities or erythema. Muscle size, tone and strength are normal.  No involuntary movements are noted. Skin:  Warm and dry. Good color, turgor and pigmentation. No lesions or scars. No cyanosis or clubbing  Neurological/Psychiatric: The patient's general behavior, level of consciousness, thought content and emotional status is normal.        Medications:  Scheduled Medications:   REM    Continuous Infusions:   REM    PRN MedicationsREM      Diagnostic Labs:  CBC:   Recent Labs     09/14/22  0513 09/15/22  0411   WBC 16.4* 13.5*   RBC 4.35 3.88*   HGB 12.9 11.9   HCT 40.8 36.3   MCV 93.8 93.6   RDW 11.8 11.9    455*       BMP:   Recent Labs     09/14/22  0513 09/15/22  0411   * 132*   K 5.4* 4.7   CL 87* 86*   CO2 33* 34*   BUN 16 19   CREATININE 0.48* 0.55       BNP: No results for input(s): BNP in the last 72 hours. PT/INR: No results for input(s): PROTIME, INR in the last 72 hours. APTT: No results for input(s): APTT in the last 72 hours. CARDIAC ENZYMES: No results for input(s): CKMB, CKMBINDEX, TROPONINI in the last 72 hours. Invalid input(s): CKTOTAL;3  FASTING LIPID PANEL:No results found for: CHOL, HDL, TRIG  LIVER PROFILE:   No results for input(s): AST, ALT, ALB, BILIDIR, BILITOT, ALKPHOS in the last 72 hours. MICROBIOLOGY:   Lab Results   Component Value Date/Time    CULTURE NO GROWTH 5 DAYS 09/10/2022 08:22 PM       Imaging:    XR CHEST PORTABLE    Result Date: 9/12/2022  Unchanged diffuse bilateral interstitial and airspace opacity, likely a combination of pulmonary fibrosis and possible superimposed pneumonia. XR CHEST PORTABLE    Result Date: 9/10/2022  Diffuse pulmonary infiltrates consistent with pneumonia.      CT CHEST PULMONARY EMBOLISM W CONTRAST    Result Date: 9/10/2022  Pulmonary fibrosis with suggestion of an underlying infectious/inflammatory   Coronary artery/atherosclerotic disease No evidence of pulmonary embolism Mediastinal and hilar lymphadenopathy, probably reactive. ASSESSMENT & PLAN     ASSESSMENT / PLAN:     Principal Problem:    Sepsis ([facility] [address]  Active Problems:    ILD (interstitial lung disease) ([facility] [address]    Acute cystitis without hematuria    IPF (idiopathic pulmonary fibrosis) ([facility] [address]  Resolved Problems:    * No resolved hospital problems. *    Sepsis: Hospice or palliative care for discussion patient. Patient hemodynamically stable. Blood cultures negative, urine cultures positive for E. coli. COPD exacerbation: Currently on high flow nasal cannula on 11 L. Wean oxygen to maintain saturations of more than 88%. Continue DuoNeb and prednisone 40 mg. Interstitial lung disease: Continue prednisone 40 mg. Patient takes prednisone 10 at home    Acute cystitis without hematuria: Urine culture positive for E. coli. PT/OT: Consulted  Discharge Planning / SW: In progress-transfer to Ferdinand Bray MD  Internal Medicine Resident, PGY-1  [address] [place], [place]  9/16/2022, 4:59 PM.  Attending Physician Statement  I have discussed the care of [facility], including pertinent history and exam findings,  with the resident. I have seen and examined the patient and the key elements of all parts of the encounter have been performed by me. I agree with the assessment, plan and orders as documented by the resident with additions . Treatment plan Discussed with nursing staff in detail , all questions answered . Electronically signed by Piyush Sinclair MD on   9/16/22 at 4:59 PM EDT    Please note that this chart was generated using voice recognition Dragon dictation software. Although every effort was made to ensure the accuracy of this automated transcription, some errors in transcription may have occurred.

## 2022-09-16 NOTE — PLAN OF CARE
Problem: Discharge Planning  Goal: Discharge to home or other facility with appropriate resources  9/16/2022 0954 by Cy Brantley RN  Outcome: Progressing  9/16/2022 0301 by Fransico Ghosh RN  Outcome: Progressing     Problem: Safety - Adult  Goal: Free from fall injury  9/16/2022 0954 by Cy Brantley RN  Outcome: Progressing  9/16/2022 0301 by Fransico Ghosh RN  Outcome: Progressing     Problem: Skin/Tissue Integrity  Goal: Absence of new skin breakdown  Description: 1. Monitor for areas of redness and/or skin breakdown  2. Assess vascular access sites hourly  3. Every 4-6 hours minimum:  Change oxygen saturation probe site  4. Every 4-6 hours:  If on nasal continuous positive airway pressure, respiratory therapy assess nares and determine need for appliance change or resting period. 9/16/2022 0954 by Cy Brantley RN  Outcome: Progressing  9/16/2022 0301 by Fransico Ghosh RN  Outcome: Progressing     Problem: Respiratory - Adult  Goal: Achieves optimal ventilation and oxygenation  9/16/2022 0954 by Cy Brantley RN  Outcome: Progressing  9/16/2022 0301 by Fransico Ghosh RN  Outcome: Progressing     Problem: ABCDS Injury Assessment  Goal: Absence of physical injury  9/16/2022 0954 by Cy Brantley RN  Outcome: Progressing  9/16/2022 0301 by Fransico Ghosh RN  Outcome: Progressing     Problem: Pain  Goal: Verbalizes/displays adequate comfort level or baseline comfort level  9/16/2022 0954 by Cy Brantley RN  Outcome: Progressing  9/16/2022 0301 by Fransico Ghosh RN  Outcome: Progressing     Problem: Nutrition Deficit:  Goal: Optimize nutritional status  9/16/2022 0954 by Cy Brantley RN  Outcome: Progressing  9/16/2022 0301 by Fransico Ghosh RN  Outcome: Progressing     Problem: Coping  Goal: Pt/Family able to verbalize concerns and demonstrate effective coping strategies  Description: INTERVENTIONS:  1.  Assist patient/family to identify coping skills, available support systems and cultural and spiritual values  2. Provide emotional support, including active listening and acknowledgement of concerns of patient and caregivers  3. Reduce environmental stimuli, as able  4. Instruct patient/family in relaxation techniques, as appropriate  5. Assess for spiritual pain/suffering and initiate Spiritual Care, Psychosocial Clinical Specialist consults as needed  9/16/2022 0954 by Dbebi Sevilla RN  Outcome: Progressing  9/16/2022 0301 by Mike Blanco RN  Outcome: Progressing     Problem: Death & Dying  Goal: Pt/Family communicate acceptance of impending death and feel psychological comfort and peace  Description: INTERVENTIONS:  1. Assess patient/family anxiety and grief process related to end of life issues  2. Provide emotional and spiritual support  3. Provide information about the patient's health status with consideration of family and cultural values  4. Communicate willingness to discuss death and facilitate grief process  with patient/family as appropriate  5. Emphasize sustaining relationships within family system and community, or nicole/spiritual traditions  6.  Initiate Spiritual Care, Psychosocial Clinical Specialist, consult as needed  9/16/2022 0954 by Debbi Sevilla RN  Outcome: Progressing  9/16/2022 0301 by Mike Blanco RN  Outcome: Progressing     Problem: Musculoskeletal - Adult  Goal: Return mobility to safest level of function  9/16/2022 0954 by Debbi Sevilla RN  Outcome: Progressing  9/16/2022 0301 by Mike Blanco RN  Outcome: Progressing

## 2022-09-16 NOTE — PLAN OF CARE
Patient in no distress when NRB is on. Patient desaturated very quickly when facemask is removed. No falls or new injuries noted. Will continue to monitor.

## 2022-09-16 NOTE — PROGRESS NOTES
[facility]  Internal Medicine Teaching Residency Program  Inpatient Daily Progress Note  ______________________________________________________________________________    Patient: Romayne Righter  YOB: 1935   YNM:[id number]    Acct: [de-identified]     Room: [facility]5791-  Admit date: 9/16/2022  Today's date: 09/16/22      SUBJECTIVE   Admitting Diagnosis: <principal problem not specified>  CC: Shortness of breath  Pt examined at bedside. Chart & results reviewed. Vitally the patient and his blood pressure of 142/63, heart rate 87  Patient is feeling down and was lethargic on exam   No acute overnight events  Patient was on 11 L high flow nasal cannula satting at 88%  CODE STATUS DNR CC  Planning the patient to discharge to hospice    ROS:  Constitutional:  negative for chills, fevers, sweats  Respiratory:  negative for cough, dyspnea on exertion, hemoptysis, shortness of breath, wheezing  Cardiovascular:  negative for chest pain, chest pressure/discomfort, lower extremity edema, palpitations  Gastrointestinal:  negative for abdominal pain, constipation, diarrhea, nausea, vomiting  Neurological:  negative for dizziness, headache  BRIEF HISTORY     The patient is a pleasant 81 y.o. female with past medical history of interstitial lung disease, COPD, asthma, hyperlipidemia came to the ER via [facility] due to complaints of shortness of breath. For the last few days patient was feeling short of breath. Patient was recently seen at [address] [place] [address] 3 days ago for similar symptoms. Daughter stated that patient has been having increased hypoxic episode for the past 2 weeks and becomes confused. Patient and daughter denies any fever, productive cough, abdominal pain, headache, nausea, vomiting, diarrhea, weakness. Although there is a slight reduction in oral appetite. As per patient's chart, patient is having difficulty urination these days.      Patient states that she curvature of the spine. No gross muscle weakness. Extremities:  No lower extremity edema, ulcerations, tenderness, varicosities or erythema. Muscle size, tone and strength are normal.  No involuntary movements are noted. Skin:  Warm and dry. Good color, turgor and pigmentation. No lesions or scars. No cyanosis or clubbing  Neurological/Psychiatric: The patient's general behavior, level of consciousness, thought content and emotional status is normal.        Medications:  Scheduled Medications:       Continuous Infusions:    sodium chloride      dextrose       PRN Medicationssodium chloride, , PRN  acetaminophen, 650 mg, Q4H PRN  albuterol sulfate HFA, 2 puff, Q6H PRN  ALPRAZolam, 0.5 mg, 4x Daily PRN  benzonatate, 100 mg, TID PRN  bisacodyl, 5 mg, Daily PRN  dextromethorphan-guaiFENesin, 10 mL, Q6H PRN  dextrose, , Continuous PRN  dextrose bolus, 125 mL, PRN   Or  dextrose bolus, 250 mL, PRN  glucagon (rDNA), 1 mg, PRN  glucose, 4 tablet, PRN  glycopyrrolate, 0.2 mg, Q4H PRN  loperamide, 2 mg, PRN  LORazepam, 1 mg, Q2H PRN  morphine, 2 mg, Q2H PRN   Or  morphine, 4 mg, Q2H PRN  morphine, 5 mg, Q2H PRN  ondansetron, 4 mg, Q8H PRN   Or  ondansetron, 4 mg, Q6H PRN  polyethylene glycol, 17 g, Daily PRN  sodium chloride flush, 5-40 mL, PRN  zolpidem, 5 mg, Nightly PRN      Diagnostic Labs:  CBC:   Recent Labs     09/14/22  0513 09/15/22  0411   WBC 16.4* 13.5*   RBC 4.35 3.88*   HGB 12.9 11.9   HCT 40.8 36.3   MCV 93.8 93.6   RDW 11.8 11.9    455*     BMP:   Recent Labs     09/14/22  0513 09/15/22  0411   * 132*   K 5.4* 4.7   CL 87* 86*   CO2 33* 34*   BUN 16 19   CREATININE 0.48* 0.55     BNP: No results for input(s): BNP in the last 72 hours. PT/INR: No results for input(s): PROTIME, INR in the last 72 hours. APTT: No results for input(s): APTT in the last 72 hours. CARDIAC ENZYMES: No results for input(s): CKMB, CKMBINDEX, TROPONINI in the last 72 hours.     Invalid input(s): CKTOTAL;3  FASTING LIPID PANEL:No results found for: CHOL, HDL, TRIG  LIVER PROFILE:   No results for input(s): AST, ALT, ALB, BILIDIR, BILITOT, ALKPHOS in the last 72 hours. MICROBIOLOGY:   Lab Results   Component Value Date/Time    CULTURE NO GROWTH 5 DAYS 09/10/2022 08:22 PM       Imaging:    XR CHEST PORTABLE    Result Date: 9/12/2022  Unchanged diffuse bilateral interstitial and airspace opacity, likely a combination of pulmonary fibrosis and possible superimposed pneumonia. XR CHEST PORTABLE    Result Date: 9/10/2022  Diffuse pulmonary infiltrates consistent with pneumonia. CT CHEST PULMONARY EMBOLISM W CONTRAST    Result Date: 9/10/2022  Pulmonary fibrosis with suggestion of an underlying infectious/inflammatory   Coronary artery/atherosclerotic disease No evidence of pulmonary embolism Mediastinal and hilar lymphadenopathy, probably reactive. ASSESSMENT & PLAN     ASSESSMENT / PLAN:     Active Problems:    * No active hospital problems. *  Resolved Problems:    * No resolved hospital problems. *    Sepsis: Hospice or palliative care for discussion patient. Patient hemodynamically stable. Blood cultures negative, urine cultures positive for E. coli. COPD exacerbation: Currently on high flow nasal cannula on 11 L. Wean oxygen to maintain saturations of more than 88%. Continue DuoNeb and prednisone 40 mg. Interstitial lung disease: Continue prednisone 40 mg. Patient takes prednisone 10 at home    Acute cystitis without hematuria: Urine culture positive for E. coli. PT/OT: Consulted  Discharge Planning / SW: In progress-transfer to hospice    Denisha Benz MD  Internal Medicine Resident, PGY-1  [facility];  [place], [place]  9/16/2022, 4:45 PM.

## 2022-09-16 NOTE — CARE COORDINATION
Pts daughter relates pt has been declining the past 4 days, she feels pt would benefit from a SNF placement for rehab then her goal is to get her mother to [address] [facility], pt has been living at [facility] in [place] and daughter Edvin Cruz [phone] does not feel pt is safe alone there. She follows with Dr Faraz Das in [facility] and Pulmonologist Dr Betzaida Law in [address] [place] [address]. Pt has had home care for a couple of hours once a week [facility]. Has Living Will and DNR status along with Medical POA who is her daughter, Greta Alatorre will bring in [facility] paperwork. Daughter will obtain list for SNF tomorrow when she comes in for choices.  If pt is to go home her address will be [address] [facility] [address] [address]
Transitional planning-1100 call from [facility]-can come see patient  between [phone].    1150 called daughter Clement Schafer will be at the hospital today. Called [address] and talked with Sade. Karina Claudio will be here between [phone].
Transitional planning-call from Tyrell at [facility] 75 beds available. 1205 call from [facility] take patient for inpatient hospice. If patient improves-the family would have to pay for hospice. [facility] will call and talk with daughter Chan Baltazar. 1500 talked with daughter Chan Baltazar. Now wanting [facility]-patient OK with that. Called [address] and talked with Sade. She will call back with time for evaluation. 1620 call from Sade at [address]. Will call tomorrow morning with time for visit.
Transitional planning-called [facility] and talked with Maddy-she is checking on when someone can come and assess the patient. She states she will call back. 0900 call from [place] at [facility]-don't have a time yet. Will call back when they get a nurse to evaluate. [address] called [address] and talked with Jena-they don't have a nurse today-she states she was sorry no one ever got back with me. They don't even have a nurse for tomorrow. Have one for Saturday at 230 PM.    1630 called [address] to verify 230 time for Saturday. Talked with Danitza Lane. Talked with daughter Neeru Salmon is OK with that.
Transitional planning-talked with patient and daughter Manish [place]. [address] inpatient. If doesn't qualify-#1[facility]wana Lucas, #2[facility], and #3[facility]y Santos. Gama Justin called [facility] [phone] and talked with Larissa Chavez. 1610 faxed face sheet, H&P, MAR, Progress notes, labs, and study reports. To [phone]. [phone] faxed referral to [facility] and talked with [facility]. Faxed referral to [facility] and talked with Bon Hillman. Faxed [facility] and left message with Simone Dickinson.
Transitional planning. Spoke to pt's daughter concerning SNF placement. She provided Choices below in order of choice. 1) [facility] or 2) Clinton Rocha  2) [address] or [place]     Referrals sent as requested.      Daughter brought in [facility] papers
Current Services Prior To Admission Oxygen Therapy   Potential Assistance Needed [facility]   DME Ordered? No   Potential Assistance Purchasing Medications No  (has been filling at [facility] in [facility])   Patient expects to be discharged to: [address] [facility] One [facility]   History of falls?  0   Services At/After Discharge   Transition of Care Consult (CM Consult) SNF   Services At/After Discharge [facility] (SNF)   Condition of Participation: Discharge Planning   The Plan for Transition of Care is related to the following treatment goals: Daughter would like pt to go to SNF for rehab at discharge, plan is for pt to go to independent living and daughter does not feel pt would qualify at this time, goal is comfort/safety

## 2022-09-17 NOTE — PROGRESS NOTES
SPIRITUAL CARE DEPARTMENT - [address]  PROGRESS NOTE    Shift date: 09/17/22    Shift day: Saturday   Shift # 1    Room # 1013/3776-73   Name: Melissa Rocha                Yazdanism: Jehovah's witness     Referral:  referral from nurse per request made by patient's family    Admit Date & Time: 9/16/2022  3:07 PM    Assessment:  Melissa Rocha is a 81 y.o. female in the hospital. Upon entering the room writer observed two of patient's children sitting near her bedside; patient appeared to be resting. Intervention:  Writer introduced self and title as  Writer offered space for the patient's family  to express feelings, needs, and concerns and provided a ministry presence.  learned that the patient had expressed a desire recently to receive last rites.  learned that patient has three children, a daughter and two sons, who are all in town today to be with their mother.  learned that one of her son's, Michael [facility] was in the [facility] [address] ED at the time of  visit for ongoing discomfort.  learned from patient's children that they believe these next few days may be her last.   provided words of assurance, space for sharing, and acknowledged/affirmed expressed feelings. Outcome:  Patient's family expressed needs and feelings, engaged in conversation, and communicated gratitude for the visit. Plan:   communicated the patient's/family's desire for anointing to Fr. Inocencio Gross.      09/17/22 1303   Encounter Summary   Service Provided For: Family   Referral/Consult From: Nurse   Support System Children   Last Encounter  09/17/22   Complexity of Encounter Low   Begin Time 1240   End Time  1300   Total Time Calculated 20 min   Encounter    Type Family Care   Spiritual/Emotional needs   Type Spiritual Support   Grief, Loss, and Adjustments   Type End of Life   Assessment/Intervention/Outcome   Assessment Calm;Coping   Intervention Active listening;Sustaining Presence/Ministry of presence   Outcome Comfort;Expressed feelings, needs, and concerns;Expressed Gratitude   Plan and Referrals   Plan/Referrals Continue Support (comment)  (patient's family requested  visit/annointed;referred to Fr. Yonatan Long)       Electronically signed by Clarissa Jain, on 9/17/2022 at 1:06 PM.  Waldo Brar  [phone]

## 2022-09-17 NOTE — PROGRESS NOTES
Family requesting to speak to provider for the next steps in plan of care.   Writer notified per family request.  Davin Aguilar RN

## 2022-09-17 NOTE — PROGRESS NOTES
Writer walked into pts room where visitor member explained they titered the high-flow an replaced sterile water. Writer educated visitor to refrain from adjusting high flow. Writer notified charge nurse of situation. Will continue to monitor.     Shila Redding RN

## 2022-09-17 NOTE — PROGRESS NOTES
Assessment:  responded to family request for sacrament of anointing of the sick. Per family, patient was in critical condition. Per family, patient was raised Anabaptism. Per family, patient stopped practicing her nicole for a long time now. Family was open to prayer. Intervention:  provided presence, offered support and prayed with family at patient's bedside. Patient received sacrament of anointing of the sick. Outcome: Family expressed appreciation for the anointing patient received. Plan: Follow up visits recommended for more prayers and support.

## 2022-09-18 NOTE — FLOWSHEET NOTE
Pt lips becoming purple in color. Bilateral ear tips are becoming mottled. Radial pulse remains strong. Pt is gurgling slightly, robinol given IV.

## 2022-09-18 NOTE — DEATH NOTES
Death Pronouncement Note  Patient's Name: Savana Stokes   Patient's YOB: 1935  MRN Number: [id number]    Admitting Provider: Thaddeus Kee MD  Attending Provider: Thaddeus Kee MD    Patient was examined and the following were absent: Pulses, Blood Pressure, and Respiratory effort    I declared the patient dead on 9/18/2022 at 6:48 AM    Preliminary Cause of Death: Respiratory arrest     Electronically signed by aYdira Rivers MD on 9/18/22 at 7:03 AM EDT

## 2022-09-18 NOTE — PLAN OF CARE
Problem: Discharge Planning  Goal: Discharge to home or other facility with appropriate resources  9/18/2022 0513 by Oleg Terry RN  Outcome: Progressing  9/17/2022 1956 by Elfego Lee RN  Outcome: Progressing     Problem: Pain  Goal: Verbalizes/displays adequate comfort level or baseline comfort level  9/18/2022 0513 by Oleg Terry RN  Outcome: Progressing  9/17/2022 1956 by Elfego Lee RN  Outcome: Progressing     Problem: Anxiety  Goal: Will report anxiety at manageable levels  Description: INTERVENTIONS:  1. Administer medication as ordered  2. Teach and rehearse alternative coping skills  3. Provide emotional support with 1:1 interaction with staff  Outcome: Progressing     Problem: Coping  Goal: Pt/Family able to verbalize concerns and demonstrate effective coping strategies  Description: INTERVENTIONS:  1. Assist patient/family to identify coping skills, available support systems and cultural and spiritual values  2. Provide emotional support, including active listening and acknowledgement of concerns of patient and caregivers  3. Reduce environmental stimuli, as able  4. Instruct patient/family in relaxation techniques, as appropriate  5. Assess for spiritual pain/suffering and initiate Spiritual Care, Psychosocial Clinical Specialist consults as needed  Outcome: Progressing     Problem: Death & Dying  Goal: Pt/Family communicate acceptance of impending death and feel psychological comfort and peace  Description: INTERVENTIONS:  1. Assess patient/family anxiety and grief process related to end of life issues  2. Provide emotional and spiritual support  3. Provide information about the patient's health status with consideration of family and cultural values  4. Communicate willingness to discuss death and facilitate grief process  with patient/family as appropriate  5. Emphasize sustaining relationships within family system and community, or nicole/spiritual traditions  6.  Initiate 4-6 hours minimum:  Change oxygen saturation probe site  4. Every 4-6 hours:  If on nasal continuous positive airway pressure, respiratory therapy assess nares and determine need for appliance change or resting period.   Outcome: Progressing

## 2022-09-18 NOTE — FLOWSHEET NOTE
Pt lips appear pale. No signs of mottling in extremities. Hand feel cooler. All peripheral pulse still very strong. Pt beginning to have very brief periods of apnea.

## 2022-09-18 NOTE — PROGRESS NOTES
[facility]   Patient Death Note  DEATH   Shift date: 22      Shift day:   Shift #: 1                 Room # 2828/7868-82   Name: Eden Larose            Age: 81 y.o. Gender: female          Presybeterian: Arianne Meals Date & Time: 2022  3:07 PM     Referral: nurse   Actual date of death: 22     TOD: 1345       SITUATION AT DEATH:  Patient was cared for by staff and hospice. IS THIS A 'S CASE? No    SPIRITUAL/EMOTIONAL INTERVENTION:  Family was present, calm and coping.  provided presence and made space for sharing, acknowledged feelings, and offered words of support      Family Received Grief Packet? Yes       NAME AND PHONE NUMBER OF DOCTOR SIGNING DEATH CERTIFICATE:  (full name) Signed by 2 RNS  , one Ferny King RN, one VANIA Goldsmith. .RN        Christy Lorenzana at [facility] was contacted by family at 7:07am and again by Alcides Guevara at 9:51am.  Yuki Brewster was notified of death at 11:12am      Copy of COMPLETED Release of Body Form Received? Yes    Patient's belongings: with patient (cross necklace) and patient's daughter (rosary and other items). Belongings not handled by      HOME:  Name: [facility]  Phone Number: [phone]    NEXT OF KIN:  Name: Maribel Ramirez - daughter (also Patricio Mccarthy - sons)  Relationship: daughter  Street Address: [address] [place] [address] Road: [facility] [address] [place]: [facility]  Zip code: [address]   Phone Number: [phone]    ANY FOLLOW-UP NEEDED? No    IF SO, WHAT?   N/a     22 1209   Encounter Summary   Service Provided For: Family   Referral/Consult From: Nurse   Support System Children   Last Encounter  22   Complexity of Encounter Low   Begin Time 1130   End Time  1140   Total Time Calculated 10 min   Spiritual/Emotional needs   Type Spiritual Support   Grief, Loss, and Adjustments   Type Death   Assessment/Intervention/Outcome   Assessment Calm;Coping   Intervention Active listening;Sustaining Presence/Ministry of presence   Outcome Comfort   Plan and Referrals   Plan/Referrals Continue Support (comment)     Electronically signed by Tana King Resident, on 9/18/2022 at 12:10 PM.  [facility] [place]  [phone]

## 2022-09-18 NOTE — FLOWSHEET NOTE
Pt brief wet in back, purewick failed. Writer and aides changed brief, jose angel care completed, and pt boosted and repositioned for comfort with pillow support. When done pt began to agonal breath and appeared more pale. Pt's son called daughter to come in.

## 2022-09-19 ENCOUNTER — TELEPHONE (OUTPATIENT)
Dept: PULMONOLOGY | Age: 87
End: 2022-09-19

## 2022-09-19 NOTE — DEATH NOTES
[facility]     Department of Internal Medicine - Inpatient Medicine Service    INPATIENT DEATH SUMMARY      PATIENT IDENTIFICATION:  NAME:  Kaya Barton   :   1935  MRN:    [id number]     Acct:    [de-identified]   Admit Date:  2022  Discharge date:  2022 11:56 AM   Attending Provider: No att. providers found                                     Principal Problem:    Sepsis ([address]  Resolved Problems:    * No resolved hospital problems. *       REASON FOR HOSPITALIZATION:   No chief complaint on file. Zena Garcia was a 81 y.o. female who was admitted for the management of Sepsis [facility], presented to the emergency department [facility] due to complaints of shortness of breath. Patient was recently seen at [facility] for similar symptoms. Patient was found to have COPD exacerbation in the background of ILD requiring high oxygen to maintain saturation. Her oxygen demand kept increasing and a rapid response was called when patient had desat due to respiratory distress. Work-up was positive for E. Coli UTI, with negative blood cultures. She was given ceftriaxone azithromycin along with duo-nebs and prednisone to help breathing. On second day of admission, patient changed her code status to [facility] initially and later changed to [facility]. Palliative was consulted and plan was made to transfer the patient to a Hospice facility. Patient remained in the hospital as in-patient hospice. Patient was cared for by staff and hospice. Family was present at bedside.  provided presence and made space for sharing, acknowledge feelings and offered support. She was pronounced dead on 22 at 0649.         Consults:   Hospice    Procedures:  None     Any Hospital Acquired Infections: None    PATIENT'S DISCHARGE CONDITION:        Disposition: Gwen Gore [address], MD  PGY-1, Internal Medicine Resident  [facility] 21

## 2022-09-26 NOTE — DISCHARGE SUMMARY
[address]     Department of Internal Medicine - Staff Internal Medicine Teaching Service    INPATIENT DISCHARGE SUMMARY      Patient Identification:  Sandrine Hewitt is a 81 y.o. female. :  1935  MRN: [id number]     Acct: [de-identified]   PCP: No primary care provider on file. Admit Date:  2022  Discharge date and time: 2022 11:56 AM   Attending Provider: No att. providers found                                     [facility] Problem Lists:  Principal Problem:    Sepsis ([address]  Resolved Problems:    * No resolved hospital problems. *      HOSPITAL STAY     Brief Inpatient course:   Sandrine Hewitt was a 81 y.o. female who was admitted for the management of Sepsis [facility], presented to the emergency department [facility] due to complaints of shortness of breath. For the last few days patient was feeling short of breath. Patient was recently seen at [facility] for similar symptoms. Patient was found to have COPD exacerbation in the background of ILD requiring high oxygen to maintain saturation. Her oxygen demand kept increasing and a rapid was also called when patient desat due to respiratory distress. Work-up was positive for E. Coli UTI, with negative blood cultures. She was given ceftriaxone azithromycin along with duo nebs and prednisone to help breathing. On second day of admission, patient changed her code status to [facility] initially, later changed to [facility]. Palliative was consulted and plan was made to transfer the patient to a Hospice facility. Patient remained in the hospital as in-patient hospice. Patient was cared for by staff and hospice. Family was present at bedside.  provided presence and made space for sharing, acknowledge feelings and offered support. She was pronounced dead on 22 at 0649. Please see death notes.        Procedures/ Significant Interventions:    None    Consults:     Consults:     Final Specialist Recommendations/Findings:   None      Any Hospital Acquired Infections: none      Amina Sevilla MD, MD  Internal Medicine Resident, PGY-1  [address] [place], [place]  9/26/2022, 8:41 AM